# Patient Record
Sex: FEMALE | Race: WHITE | NOT HISPANIC OR LATINO | Employment: UNEMPLOYED | URBAN - METROPOLITAN AREA
[De-identification: names, ages, dates, MRNs, and addresses within clinical notes are randomized per-mention and may not be internally consistent; named-entity substitution may affect disease eponyms.]

---

## 2018-12-18 ENCOUNTER — DOCUMENTATION (OUTPATIENT)
Dept: RADIOLOGY | Age: 53
End: 2018-12-18

## 2018-12-18 DIAGNOSIS — R92.1 BREAST CALCIFICATION SEEN ON MAMMOGRAM: Primary | ICD-10-CM

## 2018-12-18 NOTE — PROGRESS NOTES
I spoke with Debbie Kowalski to discuss recommendations post breast imaging, from [facility]. Debbie was able to provide the outside images and report and this was reviewed by Dr. Kunz on 12/18/18.  I have scheduled Debbie for a consult with Dr. Andrews at 12 noon and the procedure of a ST left breast bx at 1pm.  Debbie has mobility issues and she is confident she will be able to get onto the ST table for the procedure.    I have provided verbal and written instructions for the procedure as well as all of my contact information. I have answered all questions today to the patient's satisfaction.    Follow up as indicated based on pathology results.     Emily Qiunn, RN  MSN, RNC-OB, CBCN  Nurse Navigator at [facility]  [phone]    12/18/2018

## 2018-12-19 ENCOUNTER — HOSPITAL ENCOUNTER (OUTPATIENT)
Dept: RADIOLOGY | Facility: HOSPITAL | Age: 53
Discharge: HOME | End: 2018-12-19
Attending: SURGERY
Payer: COMMERCIAL

## 2018-12-19 ENCOUNTER — OFFICE VISIT (OUTPATIENT)
Dept: SURGERY | Facility: CLINIC | Age: 53
End: 2018-12-19
Payer: COMMERCIAL

## 2018-12-19 VITALS
WEIGHT: 140 LBS | BODY MASS INDEX: 21.97 KG/M2 | HEIGHT: 67 IN | SYSTOLIC BLOOD PRESSURE: 124 MMHG | DIASTOLIC BLOOD PRESSURE: 80 MMHG

## 2018-12-19 VITALS — DIASTOLIC BLOOD PRESSURE: 74 MMHG | HEART RATE: 58 BPM | OXYGEN SATURATION: 100 % | SYSTOLIC BLOOD PRESSURE: 112 MMHG

## 2018-12-19 DIAGNOSIS — R92.1 BREAST CALCIFICATION SEEN ON MAMMOGRAM: ICD-10-CM

## 2018-12-19 DIAGNOSIS — R92.1 CALCIFICATION OF LEFT BREAST: Primary | ICD-10-CM

## 2018-12-19 PROCEDURE — 99203 OFFICE O/P NEW LOW 30 MIN: CPT | Performed by: SURGERY

## 2018-12-19 PROCEDURE — 77065 DX MAMMO INCL CAD UNI: CPT | Mod: LT

## 2018-12-19 RX ORDER — LIDOCAINE HYDROCHLORIDE 10 MG/ML
30 INJECTION, SOLUTION EPIDURAL; INFILTRATION; INTRACAUDAL; PERINEURAL ONCE
Status: DISCONTINUED | OUTPATIENT
Start: 2018-12-19 | End: 2018-12-20 | Stop reason: HOSPADM

## 2018-12-19 RX ORDER — CLONIDINE HYDROCHLORIDE 0.2 MG/1
0.5 TABLET ORAL DAILY
COMMUNITY
End: 2020-04-13 | Stop reason: SDUPTHER

## 2018-12-19 RX ORDER — LORAZEPAM 0.5 MG/1
0.5 TABLET ORAL 3 TIMES DAILY
COMMUNITY
End: 2020-04-13 | Stop reason: SDUPTHER

## 2018-12-19 RX ORDER — ACETAMINOPHEN 500 MG
5 TABLET ORAL NIGHTLY
COMMUNITY

## 2018-12-19 RX ORDER — TRAZODONE HYDROCHLORIDE 100 MG/1
200 TABLET ORAL NIGHTLY
COMMUNITY
End: 2020-07-10 | Stop reason: SDUPTHER

## 2018-12-19 RX ORDER — CARISOPRODOL 350 MG/1
350 TABLET ORAL AS NEEDED
COMMUNITY
End: 2020-10-29
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
## 2018-12-20 ENCOUNTER — TRANSCRIBE ORDERS (OUTPATIENT)
Dept: SURGERY | Facility: CLINIC | Age: 53
End: 2018-12-20

## 2018-12-20 DIAGNOSIS — R92.8 OTHER ABNORMAL AND INCONCLUSIVE FINDINGS ON DIAGNOSTIC IMAGING OF BREAST: Primary | ICD-10-CM

## 2018-12-20 NOTE — PROGRESS NOTES
Discussed with Dr. Kunz 12/19/2018.  Due to insufficient thickness could not do the biopsy.  Although we are getting a machine at [facility] that would be able to do the biopsy this will not be for a couple of months and therefore patient is to have this procedure done at [facility] on 12/21/2018.

## 2018-12-21 ENCOUNTER — HOSPITAL ENCOUNTER (OUTPATIENT)
Dept: RADIOLOGY | Facility: HOSPITAL | Age: 53
Discharge: HOME | End: 2018-12-21
Attending: SURGERY
Payer: COMMERCIAL

## 2018-12-21 VITALS — SYSTOLIC BLOOD PRESSURE: 150 MMHG | HEART RATE: 66 BPM | OXYGEN SATURATION: 98 % | DIASTOLIC BLOOD PRESSURE: 70 MMHG

## 2018-12-21 DIAGNOSIS — R92.8 OTHER ABNORMAL AND INCONCLUSIVE FINDINGS ON DIAGNOSTIC IMAGING OF BREAST: ICD-10-CM

## 2018-12-21 PROCEDURE — 25000000 HC PHARMACY GENERAL: Performed by: RADIOLOGY

## 2018-12-21 PROCEDURE — 0HBU3ZX EXCISION OF LEFT BREAST, PERCUTANEOUS APPROACH, DIAGNOSTIC: ICD-10-PCS | Performed by: RADIOLOGY

## 2018-12-21 PROCEDURE — 76642 ULTRASOUND BREAST LIMITED: CPT | Mod: LT

## 2018-12-21 PROCEDURE — 36100345 BI STEREOTACTIC BREAST BIOPSY LEFT: Mod: LT

## 2018-12-21 PROCEDURE — 88305 TISSUE EXAM BY PATHOLOGIST: CPT | Performed by: SURGERY

## 2018-12-21 RX ORDER — LIDOCAINE HYDROCHLORIDE 10 MG/ML
20 INJECTION, SOLUTION EPIDURAL; INFILTRATION; INTRACAUDAL; PERINEURAL ONCE
Status: COMPLETED | OUTPATIENT
Start: 2018-12-21 | End: 2018-12-21

## 2018-12-21 RX ADMIN — LIDOCAINE HYDROCHLORIDE 20 ML: 10 INJECTION, SOLUTION EPIDURAL; INFILTRATION; INTRACAUDAL; PERINEURAL at 15:23

## 2018-12-21 NOTE — POST-PROCEDURE NOTE
Immediate Breast Interventional Postprocedure Note    Debbie Kowalski     Attending: Enrique Muñiz M.D.  Assistant: Technologist      Diagnosis: Breast Abnormality    Description of procedure: Imaging Guided Percutaneous Breast Biopsy     Laterality: Left    Anesthesia:  Local    Findings: Breast Abnormality    Complications: None    Estimated Blood Loss: Less than 100 ml    Specimens: Breast Tissue---No calcifications seen in specimen.    12/21/2018 3:23 PM

## 2018-12-24 LAB
CASE RPRT: NORMAL
CLINICAL INFO: NORMAL
PATH REPORT.FINAL DX SPEC: NORMAL
PATH REPORT.FINAL DX SPEC: NORMAL
PATH REPORT.GROSS SPEC: NORMAL

## 2018-12-26 ENCOUNTER — DOCUMENTATION (OUTPATIENT)
Dept: RADIOLOGY | Age: 53
End: 2018-12-26

## 2018-12-26 NOTE — PROGRESS NOTES
I had a discussion with Leoncio today:  SHE WAS UPSET ABOUT THE DISCORDANT PATHOLOGY FROM HER BIOPSY.  SHE AND I DISCUSSED THE POSITIONING OF THE CALCIFICATIONS AND THE IMPLANT AS WELL AS THE PAUCITY OF BREAST TISSUE IN THAT AREA.  SHE HAS SPOKEN WITH  AND SCHEDULED A F/U APPT FOR 1/2/18 TO DISCUSS NL.  WE DISCUSSED PROCEDURE AND QUESTIONS TO ASK AT AT VISIT WITH DR. KAUFMAN ON 1/2/18 AS FAR AS IMPLANT MANAGEMENT AND CONCERNS.  I WILL F/U WITH LEONCIO BASED ON PATHOLOGY OF NL.

## 2018-12-28 ENCOUNTER — TELEPHONE (OUTPATIENT)
Dept: SURGERY | Facility: CLINIC | Age: 53
End: 2018-12-28

## 2018-12-28 NOTE — TELEPHONE ENCOUNTER
Patient called to let us know that she would like to cancel her upcoming appointment with us.She thanks  for everything he has done for her.She thinks he's a wonderful doctor but feels more comfortable with a breast surgeon because of her implants .Patient is seeing Dr.Alina Ben[facility]tez at [facility].Patient also stated she has reached out to Emily regarding all of this an also about getting her Pathology Slides.I wished the patient the best of luck an let her know we are here if she needs us.

## 2019-11-04 ENCOUNTER — HOSPITAL ENCOUNTER (OUTPATIENT)
Facility: CLINIC | Age: 54
Discharge: HOME | End: 2019-11-04
Attending: EMERGENCY MEDICINE
Payer: COMMERCIAL

## 2019-11-04 ENCOUNTER — APPOINTMENT (EMERGENCY)
Dept: RADIOLOGY | Facility: HOSPITAL | Age: 54
End: 2019-11-04
Attending: EMERGENCY MEDICINE
Payer: COMMERCIAL

## 2019-11-04 ENCOUNTER — HOSPITAL ENCOUNTER (EMERGENCY)
Facility: HOSPITAL | Age: 54
Discharge: HOME | End: 2019-11-04
Attending: EMERGENCY MEDICINE
Payer: COMMERCIAL

## 2019-11-04 VITALS
OXYGEN SATURATION: 99 % | HEIGHT: 67 IN | DIASTOLIC BLOOD PRESSURE: 75 MMHG | TEMPERATURE: 98.8 F | HEART RATE: 88 BPM | RESPIRATION RATE: 18 BRPM | BODY MASS INDEX: 21.97 KG/M2 | WEIGHT: 140 LBS | SYSTOLIC BLOOD PRESSURE: 124 MMHG

## 2019-11-04 VITALS
OXYGEN SATURATION: 98 % | RESPIRATION RATE: 18 BRPM | SYSTOLIC BLOOD PRESSURE: 141 MMHG | TEMPERATURE: 98.4 F | DIASTOLIC BLOOD PRESSURE: 74 MMHG | HEART RATE: 77 BPM

## 2019-11-04 DIAGNOSIS — K56.7 ILEUS (CMS/HCC): Primary | ICD-10-CM

## 2019-11-04 DIAGNOSIS — R10.84 GENERALIZED ABDOMINAL PAIN: Primary | ICD-10-CM

## 2019-11-04 LAB
ALBUMIN SERPL-MCNC: 4.4 G/DL (ref 3.4–5)
ALP SERPL-CCNC: 58 IU/L (ref 35–126)
ALT SERPL-CCNC: 16 IU/L (ref 11–54)
ANION GAP SERPL CALC-SCNC: 8 MEQ/L (ref 3–15)
AST SERPL-CCNC: 22 IU/L (ref 15–41)
BACTERIA URNS QL MICRO: ABNORMAL /HPF
BASOPHILS # BLD: 0.02 K/UL (ref 0.01–0.1)
BASOPHILS NFR BLD: 0.3 %
BILIRUB DIRECT SERPL-MCNC: 0.2 MG/DL
BILIRUB SERPL-MCNC: 0.6 MG/DL (ref 0.3–1.2)
BILIRUB UR QL STRIP.AUTO: NEGATIVE MG/DL
BUN SERPL-MCNC: 12 MG/DL (ref 8–20)
CALCIUM SERPL-MCNC: 8.6 MG/DL (ref 8.9–10.3)
CHLORIDE SERPL-SCNC: 105 MEQ/L (ref 98–109)
CLARITY UR REFRACT.AUTO: CLEAR
CO2 SERPL-SCNC: 24 MEQ/L (ref 22–32)
COLOR UR AUTO: YELLOW
CREAT SERPL-MCNC: 0.7 MG/DL
DIFFERENTIAL METHOD BLD: ABNORMAL
EOSINOPHIL # BLD: 0.01 K/UL (ref 0.04–0.36)
EOSINOPHIL NFR BLD: 0.1 %
ERYTHROCYTE [DISTWIDTH] IN BLOOD BY AUTOMATED COUNT: 12.2 % (ref 11.7–14.4)
FLUAV RNA SPEC QL NAA+PROBE: NEGATIVE
FLUBV RNA SPEC QL NAA+PROBE: NEGATIVE
GFR SERPL CREATININE-BSD FRML MDRD: >60 ML/MIN/1.73M*2
GLUCOSE SERPL-MCNC: 105 MG/DL (ref 70–99)
GLUCOSE UR STRIP.AUTO-MCNC: NEGATIVE MG/DL
HCT VFR BLDCO AUTO: 43 %
HGB BLD-MCNC: 14.5 G/DL (ref 11.8–15.7)
HGB UR QL STRIP.AUTO: NEGATIVE
HYALINE CASTS #/AREA URNS LPF: ABNORMAL /LPF
IMM GRANULOCYTES # BLD AUTO: 0.02 K/UL (ref 0–0.08)
IMM GRANULOCYTES NFR BLD AUTO: 0.3 %
KETONES UR STRIP.AUTO-MCNC: ABNORMAL MG/DL
LEUKOCYTE ESTERASE UR QL STRIP.AUTO: NEGATIVE
LIPASE SERPL-CCNC: 38 U/L (ref 20–51)
LYMPHOCYTES # BLD: 1.95 K/UL (ref 1.2–3.5)
LYMPHOCYTES NFR BLD: 26.5 %
MAGNESIUM SERPL-MCNC: 1.8 MG/DL (ref 1.8–2.5)
MCH RBC QN AUTO: 30.9 PG (ref 28–33.2)
MCHC RBC AUTO-ENTMCNC: 33.7 G/DL (ref 32.2–35.5)
MCV RBC AUTO: 91.7 FL (ref 83–98)
MONOCYTES # BLD: 1.04 K/UL (ref 0.28–0.8)
MONOCYTES NFR BLD: 14.1 %
NEUTROPHILS # BLD: 4.32 K/UL (ref 1.7–7)
NEUTS SEG NFR BLD: 58.7 %
NITRITE UR QL STRIP.AUTO: NEGATIVE
NRBC BLD-RTO: 0 %
PDW BLD AUTO: 10.2 FL (ref 9.4–12.3)
PH UR STRIP.AUTO: 6.5 [PH]
PLATELET # BLD AUTO: 264 K/UL
POTASSIUM SERPL-SCNC: 3.5 MEQ/L (ref 3.6–5.1)
PROT SERPL-MCNC: 7.8 G/DL (ref 6–8.2)
PROT UR QL STRIP.AUTO: ABNORMAL
RBC # BLD AUTO: 4.69 M/UL (ref 3.93–5.22)
RBC #/AREA URNS HPF: ABNORMAL /HPF
RSV RNA SPEC QL NAA+PROBE: NEGATIVE
SODIUM SERPL-SCNC: 137 MEQ/L (ref 136–144)
SP GR UR REFRACT.AUTO: >1.035
SQUAMOUS URNS QL MICRO: 1 /HPF
TROPONIN I SERPL-MCNC: <0.02 NG/ML
UROBILINOGEN UR STRIP-ACNC: 0.2 EU/DL
WBC # BLD AUTO: 7.36 K/UL
WBC #/AREA URNS HPF: ABNORMAL /HPF

## 2019-11-04 PROCEDURE — 96360 HYDRATION IV INFUSION INIT: CPT

## 2019-11-04 PROCEDURE — 3E0337Z INTRODUCTION OF ELECTROLYTIC AND WATER BALANCE SUBSTANCE INTO PERIPHERAL VEIN, PERCUTANEOUS APPROACH: ICD-10-PCS | Performed by: EMERGENCY MEDICINE

## 2019-11-04 PROCEDURE — 36415 COLL VENOUS BLD VENIPUNCTURE: CPT | Performed by: EMERGENCY MEDICINE

## 2019-11-04 PROCEDURE — 63600105 HC IODINE BASED CONTRAST: Performed by: EMERGENCY MEDICINE

## 2019-11-04 PROCEDURE — 81003 URINALYSIS AUTO W/O SCOPE: CPT | Performed by: EMERGENCY MEDICINE

## 2019-11-04 PROCEDURE — 71045 X-RAY EXAM CHEST 1 VIEW: CPT

## 2019-11-04 PROCEDURE — 99284 EMERGENCY DEPT VISIT MOD MDM: CPT | Mod: 25

## 2019-11-04 PROCEDURE — 25000000 HC PHARMACY GENERAL: Performed by: EMERGENCY MEDICINE

## 2019-11-04 PROCEDURE — 84484 ASSAY OF TROPONIN QUANT: CPT | Performed by: EMERGENCY MEDICINE

## 2019-11-04 PROCEDURE — 99213 OFFICE O/P EST LOW 20 MIN: CPT | Performed by: EMERGENCY MEDICINE

## 2019-11-04 PROCEDURE — 74177 CT ABD & PELVIS W/CONTRAST: CPT

## 2019-11-04 PROCEDURE — 83690 ASSAY OF LIPASE: CPT | Performed by: EMERGENCY MEDICINE

## 2019-11-04 PROCEDURE — 83735 ASSAY OF MAGNESIUM: CPT | Performed by: EMERGENCY MEDICINE

## 2019-11-04 PROCEDURE — 63600000 HC DRUGS/DETAIL CODE: Performed by: EMERGENCY MEDICINE

## 2019-11-04 PROCEDURE — 85025 COMPLETE CBC W/AUTO DIFF WBC: CPT | Performed by: EMERGENCY MEDICINE

## 2019-11-04 PROCEDURE — 96361 HYDRATE IV INFUSION ADD-ON: CPT

## 2019-11-04 PROCEDURE — 63700000 HC SELF-ADMINISTRABLE DRUG: Performed by: EMERGENCY MEDICINE

## 2019-11-04 PROCEDURE — 93005 ELECTROCARDIOGRAM TRACING: CPT | Performed by: EMERGENCY MEDICINE

## 2019-11-04 PROCEDURE — 25800000 HC PHARMACY IV SOLUTIONS: Performed by: EMERGENCY MEDICINE

## 2019-11-04 PROCEDURE — 87631 RESP VIRUS 3-5 TARGETS: CPT | Performed by: EMERGENCY MEDICINE

## 2019-11-04 PROCEDURE — S9088 SERVICES PROVIDED IN URGENT: HCPCS | Performed by: EMERGENCY MEDICINE

## 2019-11-04 PROCEDURE — 80053 COMPREHEN METABOLIC PANEL: CPT | Performed by: EMERGENCY MEDICINE

## 2019-11-04 RX ORDER — OXYCODONE AND ACETAMINOPHEN 5; 325 MG/1; MG/1
1 TABLET ORAL EVERY 6 HOURS PRN
Qty: 5 TABLET | Refills: 0 | Status: SHIPPED | OUTPATIENT
Start: 2019-11-04 | End: 2020-05-23 | Stop reason: SDUPTHER

## 2019-11-04 RX ORDER — ONDANSETRON 4 MG/1
4 TABLET, ORALLY DISINTEGRATING ORAL EVERY 8 HOURS PRN
Qty: 12 TABLET | Refills: 0 | Status: SHIPPED | OUTPATIENT
Start: 2019-11-04 | End: 2020-10-29

## 2019-11-04 RX ORDER — KETOROLAC TROMETHAMINE 15 MG/ML
15 INJECTION, SOLUTION INTRAMUSCULAR; INTRAVENOUS ONCE
Status: COMPLETED | OUTPATIENT
Start: 2019-11-04 | End: 2019-11-04

## 2019-11-04 RX ORDER — IPRATROPIUM BROMIDE AND ALBUTEROL SULFATE 2.5; .5 MG/3ML; MG/3ML
3 SOLUTION RESPIRATORY (INHALATION) ONCE
Status: COMPLETED | OUTPATIENT
Start: 2019-11-04 | End: 2019-11-04

## 2019-11-04 RX ORDER — LORAZEPAM 0.5 MG/1
TABLET ORAL
Status: DISCONTINUED
Start: 2019-11-04 | End: 2019-11-05 | Stop reason: HOSPADM

## 2019-11-04 RX ORDER — LORAZEPAM 0.5 MG/1
0.5 TABLET ORAL ONCE
Status: COMPLETED | OUTPATIENT
Start: 2019-11-04 | End: 2019-11-04

## 2019-11-04 RX ORDER — MORPHINE SULFATE 4 MG/ML
4 INJECTION, SOLUTION INTRAMUSCULAR; INTRAVENOUS ONCE
Status: COMPLETED | OUTPATIENT
Start: 2019-11-04 | End: 2019-11-04

## 2019-11-04 RX ADMIN — KETOROLAC TROMETHAMINE 15 MG: 15 INJECTION, SOLUTION INTRAMUSCULAR; INTRAVENOUS at 16:31

## 2019-11-04 RX ADMIN — LORAZEPAM 0.5 MG: 0.5 TABLET ORAL at 17:00

## 2019-11-04 RX ADMIN — SODIUM CHLORIDE 2000 ML: 9 INJECTION, SOLUTION INTRAVENOUS at 16:32

## 2019-11-04 RX ADMIN — IOHEXOL 115 ML: 350 INJECTION, SOLUTION INTRAVENOUS at 19:35

## 2019-11-04 RX ADMIN — IPRATROPIUM BROMIDE AND ALBUTEROL SULFATE 3 ML: 2.5; .5 SOLUTION RESPIRATORY (INHALATION) at 16:33

## 2019-11-04 RX ADMIN — MORPHINE SULFATE 4 MG: 4 INJECTION, SOLUTION INTRAMUSCULAR; INTRAVENOUS at 16:31

## 2019-11-04 ASSESSMENT — ENCOUNTER SYMPTOMS
CHILLS: 1
PALPITATIONS: 0
DIAPHORESIS: 0
STRIDOR: 0
SEIZURES: 0
COUGH: 1
SORE THROAT: 1
WHEEZING: 1
SPEECH DIFFICULTY: 0
ABDOMINAL PAIN: 1
DIZZINESS: 1
FEVER: 1
BACK PAIN: 0
BELCHING: 1
CONFUSION: 0
NUMBNESS: 0
EYE REDNESS: 1
NECK PAIN: 0
BLOOD IN STOOL: 0
WOUND: 0
DIARRHEA: 1
VOMITING: 1
ABDOMINAL PAIN: 1
HEMATURIA: 0
FLANK PAIN: 0
HEADACHES: 1
DIARRHEA: 1
SHORTNESS OF BREATH: 1
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
## 2019-11-05 LAB
ATRIAL RATE: 82
P AXIS: 85
PR INTERVAL: 198
QRS DURATION: 78
QT INTERVAL: 384
QTC CALCULATION(BAZETT): 448
R AXIS: 43
T WAVE AXIS: 57
VENTRICULAR RATE: 82

## 2019-11-05 PROCEDURE — 93010 ELECTROCARDIOGRAM REPORT: CPT | Performed by: INTERNAL MEDICINE

## 2020-04-13 ENCOUNTER — TELEMEDICINE (OUTPATIENT)
Dept: FAMILY MEDICINE | Facility: CLINIC | Age: 55
End: 2020-04-13
Payer: COMMERCIAL

## 2020-04-13 DIAGNOSIS — G90.522 COMPLEX REGIONAL PAIN SYNDROME TYPE 1 OF LEFT LOWER EXTREMITY: Primary | ICD-10-CM

## 2020-04-13 PROCEDURE — 99212 OFFICE O/P EST SF 10 MIN: CPT | Mod: 95 | Performed by: INTERNAL MEDICINE

## 2020-04-13 RX ORDER — LORAZEPAM 0.5 MG/1
0.5 TABLET ORAL 3 TIMES DAILY
Qty: 90 TABLET | Refills: 1 | Status: SHIPPED | OUTPATIENT
Start: 2020-04-13 | End: 2020-06-10 | Stop reason: SDUPTHER

## 2020-04-13 RX ORDER — CLONIDINE HYDROCHLORIDE 0.2 MG/1
0.5 TABLET ORAL DAILY
Qty: 90 TABLET | Refills: 1 | Status: SHIPPED | OUTPATIENT
Start: 2020-04-13 | End: 2020-04-16

## 2020-04-13 RX ORDER — BACLOFEN 10 MG/1
10 TABLET ORAL 2 TIMES DAILY
Qty: 180 TABLET | Refills: 1 | Status: SHIPPED | OUTPATIENT
Start: 2020-04-13 | End: 2022-01-05 | Stop reason: ALTCHOICE

## 2020-04-13 NOTE — PROGRESS NOTES
Request for Consent:  You and I are about to have a telemedicine check-in or visit. This is allowed because you are already my patient, and you have requested it.  This telemedicine visit will be billed to your health insurance or you, if you are self-insured.  You understand you will be responsible for any copayments or coinsurances that apply to your telemedicine visit.  Before starting our telemedicine visit, I am required to get your consent for this virtual check-in or visit by telemedicine. Do you consent?      Patient Response to Request for Consent: Yes    The following have been reviewed and updated as appropriate in this visit:         Visit Documentation:    53 y/o female presents for telemedicine visit.   CRPS - baclofen and soma prn   Medical marijuana helping     Still left arm pain - once able to purse cervical injetion  Time spent 12 minutes  A&P   1. CRPS - continue current medication regimen.    Time Spent in Medical Discussion During This Encounter:  12 minutes

## 2020-04-15 ENCOUNTER — TELEPHONE (OUTPATIENT)
Dept: FAMILY MEDICINE | Facility: CLINIC | Age: 55
End: 2020-04-15

## 2020-04-16 ENCOUNTER — TELEPHONE (OUTPATIENT)
Dept: FAMILY MEDICINE | Facility: CLINIC | Age: 55
End: 2020-04-16

## 2020-04-16 RX ORDER — CLONIDINE HYDROCHLORIDE 0.1 MG/1
0.1 TABLET ORAL 2 TIMES DAILY
Qty: 180 TABLET | Refills: 1 | Status: SHIPPED | OUTPATIENT
Start: 2020-04-16 | End: 2020-07-10 | Stop reason: SDUPTHER

## 2020-04-16 NOTE — TELEPHONE ENCOUNTER
Pharmacist Galina 4  invoice # [phone]  Would like a call back with questions regarding  Both  Medication Rx Clonidine 0.1 mg and Rx Clonidine 0.2mg

## 2020-04-16 NOTE — TELEPHONE ENCOUNTER
Patient states clonidine should be 0.1 MG.    Script was sent for 0.2MG (please also review directions)    Need to send corrected script to [facility]

## 2020-05-07 ENCOUNTER — TELEMEDICINE (OUTPATIENT)
Dept: FAMILY MEDICINE | Facility: CLINIC | Age: 55
End: 2020-05-07
Payer: COMMERCIAL

## 2020-05-07 DIAGNOSIS — G44.52 NEW DAILY PERSISTENT HEADACHE: ICD-10-CM

## 2020-05-07 DIAGNOSIS — G90.522 COMPLEX REGIONAL PAIN SYNDROME TYPE 1 OF LEFT LOWER EXTREMITY: Primary | ICD-10-CM

## 2020-05-07 PROCEDURE — 99213 OFFICE O/P EST LOW 20 MIN: CPT | Mod: 95 | Performed by: INTERNAL MEDICINE

## 2020-05-07 RX ORDER — OMEPRAZOLE 40 MG/1
40 CAPSULE, DELAYED RELEASE ORAL
Qty: 90 CAPSULE | Refills: 1 | Status: SHIPPED | OUTPATIENT
Start: 2020-05-07 | End: 2022-10-20

## 2020-05-07 NOTE — PROGRESS NOTES
Verification of Patient Location:  The patient affirms they are currently located in the following state:[place]     Request for Consent:  You and I are about to have a telemedicine check-in or visit. This is allowed because you are already my patient, and you have requested it.  This telemedicine visit will be billed to your health insurance or you, if you are self-insured.  You understand you will be responsible for any copayments or coinsurances that apply to your telemedicine visit.  Before starting our telemedicine visit, I am required to get your consent for this virtual check-in or visit by telemedicine. Do you consent?      Patient Response to Request for Consent: Yes    The following have been reviewed and updated as appropriate in this visit:         Visit Documentation:    55 y/o female presents for telemedicine visit  Morning headaches x 1 week  Also hot flashes at night, poor sleep   Increased anxiety.   No visual changes  Headache last 1-2 hours   Time Spent 15 minutes   A&P  1. Headache - ? Poor sleep  Check BP in the morning   Monitor   Time Spent in Medical Discussion During This Encounter:  15 minutes

## 2020-05-18 ENCOUNTER — TELEPHONE (OUTPATIENT)
Dept: FAMILY MEDICINE | Facility: CLINIC | Age: 55
End: 2020-05-18

## 2020-05-18 NOTE — TELEPHONE ENCOUNTER
"Patient was asked to monitor bp, states woke up one morning feeling \"heated\" with headache and bp reading of 175/90 also pulse of 75    States since then has been 15/68, pulse usually runs in low 60's.    Patient can be reached at [phone]  "

## 2020-05-22 NOTE — TELEPHONE ENCOUNTER
Medicine Refill Request    Last Office Visit: Visit date not found  Last Telemedicine Visit: 5/7/2020 Liang Gibbs DO    Next Office Visit: Visit date not found  Next Telemedicine Visit: Visit date not found         Current Outpatient Medications:   •  baclofen (LIORESAL) 10 mg tablet, Take 1 tablet (10 mg total) by mouth 2 (two) times a day., Disp: 180 tablet, Rfl: 1  •  carisoprodol (SOMA) 350 mg tablet, Take 350 mg by mouth as needed for muscle spasms., Disp: , Rfl:   •  cloNIDine (CATAPRES) 0.1 mg tablet, Take 1 tablet (0.1 mg total) by mouth 2 (two) times a day., Disp: 180 tablet, Rfl: 1  •  LORazepam (ATIVAN) 0.5 mg tablet, Take 1 tablet (0.5 mg total) by mouth 3 (three) times a day., Disp: 90 tablet, Rfl: 1  •  melatonin 3 mg tablet, Take 5 mg by mouth nightly., Disp: , Rfl:   •  NOT IN DATABASE, Patient on a antibiotic bid , not sure of the name, Disp: , Rfl:   •  omeprazole (PriLOSEC) 40 mg capsule, Take 1 capsule (40 mg total) by mouth daily before breakfast., Disp: 90 capsule, Rfl: 1  •  ondansetron ODT (ZOFRAN-ODT) 4 mg disintegrating tablet, Take 1 tablet (4 mg total) by mouth every 8 (eight) hours as needed for nausea or vomiting., Disp: 12 tablet, Rfl: 0  •  oxyCODONE-acetaminophen (PERCOCET) 5-325 mg per tablet, Take 1 tablet by mouth every 6 (six) hours as needed for moderate pain. Initial treatment.  NO driving or operating heavy machinery, Disp: 5 tablet, Rfl: 0  •  traZODone (DESYREL) 100 mg tablet, Take 200 mg by mouth nightly., Disp: , Rfl:       BP Readings from Last 3 Encounters:   11/04/19 (!) 141/74   11/04/19 124/75   12/21/18 (!) 150/70       Recent Lab results:  No results found for: CHOL, No results found for: HDL, No results found for: LDLCALC, No results found for: TRIG     Lab Results   Component Value Date    GLUCOSE 105 (H) 11/04/2019   , No results found for: HGBA1C      Lab Results   Component Value Date    CREATININE 0.7 11/04/2019       No results found for: TSH

## 2020-05-23 RX ORDER — HYDROCODONE BITARTRATE AND ACETAMINOPHEN 5; 325 MG/1; MG/1
1 TABLET ORAL 2 TIMES DAILY PRN
Qty: 30 TABLET | Refills: 0 | Status: SHIPPED | OUTPATIENT
Start: 2020-05-23 | End: 2020-08-06 | Stop reason: SDUPTHER

## 2020-05-23 NOTE — PROGRESS NOTES
Patient requesting a refill for Hydrocodone. Reports calling office earlier this week.   PDMP reviewed. Uses sparingly. Will refill

## 2020-05-26 RX ORDER — OXYCODONE AND ACETAMINOPHEN 5; 325 MG/1; MG/1
1 TABLET ORAL EVERY 6 HOURS PRN
Qty: 5 TABLET | Refills: 0 | Status: SHIPPED | OUTPATIENT
Start: 2020-05-26 | End: 2020-08-06 | Stop reason: SDUPTHER

## 2020-05-28 ENCOUNTER — TELEPHONE (OUTPATIENT)
Dept: FAMILY MEDICINE | Facility: CLINIC | Age: 55
End: 2020-05-28

## 2020-05-28 RX ORDER — PAROXETINE 10 MG/1
10 TABLET, FILM COATED ORAL DAILY
Qty: 90 TABLET | Refills: 3 | Status: SHIPPED | OUTPATIENT
Start: 2020-05-28 | End: 2021-03-26 | Stop reason: SDUPTHER

## 2020-06-10 ENCOUNTER — CONSULT (OUTPATIENT)
Dept: FAMILY MEDICINE | Facility: CLINIC | Age: 55
End: 2020-06-10
Payer: COMMERCIAL

## 2020-06-10 VITALS
BODY MASS INDEX: 22.76 KG/M2 | DIASTOLIC BLOOD PRESSURE: 78 MMHG | TEMPERATURE: 98.3 F | OXYGEN SATURATION: 95 % | HEART RATE: 67 BPM | WEIGHT: 145 LBS | RESPIRATION RATE: 16 BRPM | SYSTOLIC BLOOD PRESSURE: 120 MMHG | HEIGHT: 67 IN

## 2020-06-10 DIAGNOSIS — Z01.818 PREOPERATIVE CLEARANCE: ICD-10-CM

## 2020-06-10 DIAGNOSIS — G90.522 COMPLEX REGIONAL PAIN SYNDROME TYPE 1 OF LEFT LOWER EXTREMITY: ICD-10-CM

## 2020-06-10 DIAGNOSIS — F43.10 PTSD (POST-TRAUMATIC STRESS DISORDER): ICD-10-CM

## 2020-06-10 DIAGNOSIS — M79.642 LEFT HAND PAIN: Primary | ICD-10-CM

## 2020-06-10 PROCEDURE — 93000 ELECTROCARDIOGRAM COMPLETE: CPT | Performed by: INTERNAL MEDICINE

## 2020-06-10 PROCEDURE — 99214 OFFICE O/P EST MOD 30 MIN: CPT | Performed by: INTERNAL MEDICINE

## 2020-06-10 RX ORDER — AMMONIUM LACTATE 12 G/100G
CREAM TOPICAL
COMMUNITY
Start: 2018-12-15 | End: 2020-10-29

## 2020-06-10 RX ORDER — MULTIVITAMIN
TABLET ORAL
COMMUNITY
Start: 2014-12-19

## 2020-06-10 RX ORDER — LORAZEPAM 0.5 MG/1
0.5 TABLET ORAL 3 TIMES DAILY
Qty: 90 TABLET | Refills: 1 | Status: SHIPPED | OUTPATIENT
Start: 2020-06-10 | End: 2020-08-10

## 2020-06-10 NOTE — PROGRESS NOTES
"Subjective      Patient ID: Debbie Kowalski is a 54 y.o. female.  1965      53 y/o female presents for pre-operative evaluation   Left hand reconstruction sheath - 6/17    METs > 4 with use of crutches or 4 prong cane   No CP, SOB  No easy bruising bleeding   History of surgeries in the past   No adverse reaction to general anesthesia in the past         The following have been reviewed and updated as appropriate in this visit:  Tobacco  Allergies  Meds  Med Hx  Surg Hx  Fam Hx  Soc Hx      Review of Systems   All other systems reviewed and are negative.      Objective     Vitals:    06/10/20 1003   BP: 120/78   BP Location: Right upper arm   Patient Position: Sitting   Pulse: 67   Resp: 16   Temp: 36.8 °C (98.3 °F)   TempSrc: Tympanic   SpO2: 95%   Weight: 65.8 kg (145 lb)   Height: 1.702 m (5' 7\")     Body mass index is 22.71 kg/m².    Physical Exam   Constitutional: She is oriented to person, place, and time. She appears well-developed and well-nourished.   HENT:   Head: Normocephalic and atraumatic.   Eyes: Pupils are equal, round, and reactive to light. Conjunctivae and EOM are normal.   Cardiovascular: Normal rate, regular rhythm, normal heart sounds and intact distal pulses.   No murmur heard.  Pulmonary/Chest: Effort normal and breath sounds normal. She has no wheezes.   Musculoskeletal: She exhibits no edema.   Neurological: She is alert and oriented to person, place, and time.   Psychiatric: She has a normal mood and affect. Her behavior is normal. Judgment and thought content normal.       Assessment/Plan   Diagnoses and all orders for this visit:    Preoperative clearance (Primary)  -     ECG 12 LEAD OFFICE PERFORMED    Left hand pain  Patient is low risk for surgery   She has no underlying cardiovascular disease nor HTN   Her EKG - sinus 60 axis wnl, no ST changes, T waves in V1, V2 no change from prior 11/2019   Do not take any NSAIDs (Motrin, Advil, Ibuprofen, Naproxen, Aleve, Aspirin or any " prescription) 7 days prior to surgery  Also please do not take if taking fish oil 7 days prior to surgery     PTSD (post-traumatic stress disorder)    Complex regional pain syndrome type 1 of left lower extremity    Other orders  -     LORazepam (ATIVAN) 0.5 mg tablet; Take 1 tablet (0.5 mg total) by mouth 3 (three) times a day.

## 2020-06-10 NOTE — PATIENT INSTRUCTIONS
Do not take any NSAIDs (Motrin, Advil, Ibuprofen, Naproxen, Aleve, Aspirin or any prescription) 7 days prior to surgery  Also please do not take if taking fish oil 7 days prior to surgery   Proceed with surgery

## 2020-06-30 ENCOUNTER — TRANSCRIBE ORDERS (OUTPATIENT)
Dept: OCCUPATIONAL THERAPY | Facility: HOSPITAL | Age: 55
End: 2020-06-30

## 2020-06-30 ENCOUNTER — HOSPITAL ENCOUNTER (OUTPATIENT)
Dept: OCCUPATIONAL THERAPY | Facility: HOSPITAL | Age: 55
Setting detail: THERAPIES SERIES
Discharge: HOME | End: 2020-06-30
Attending: ORTHOPAEDIC SURGERY
Payer: COMMERCIAL

## 2020-06-30 DIAGNOSIS — M65.832 OTHER SYNOVITIS AND TENOSYNOVITIS, LEFT FOREARM: Primary | ICD-10-CM

## 2020-06-30 DIAGNOSIS — M65.832 OTHER SYNOVITIS AND TENOSYNOVITIS, LEFT FOREARM: ICD-10-CM

## 2020-06-30 PROCEDURE — 97760 ORTHOTIC MGMT&TRAING 1ST ENC: CPT | Mod: GO

## 2020-06-30 PROCEDURE — 97165 OT EVAL LOW COMPLEX 30 MIN: CPT | Mod: GO

## 2020-06-30 NOTE — OP OT TREATMENT LOG
Diagnosis     Ins:   Eval: 6/30/20  POC till: 8/25/20          Total Time for Session Not performed   Modalities Comment Time Done Today Group   Paraffin       Fluidotherapy       Ultrasound       Hot Pack                      Total Time for Session Not performed   Wound Care Comment Time Done Today   < 20 cm       >20 cm      Scar control                   Total Time for Session Not performed   Massage Comment Time Done Today   Retrograde      Scar      Soft Tissue Massage             Total Time for Session Not performed   Therapeutic Exercise Comment Time Done Today   AROM      PROM      AAROM      TGE      Blocking Exercise                   Total Time for Session Not performed   Therapeutic Activity Comment Sets Reps Time Done Today Group   BTE         Peg Feeding - DLM         Velcro checkers         Clothespin/ruler         Gripper/ 1” blocks         Corn discrimination         Rice discrimination         [place] Transfer         O'Santos tweezer         O'Santos pegboard         Grooved pegboard         Wrist Roll         Theraputty         Tyler box         Pipe Assembly - PVC         Pipe assembly - metal         Connect a link         MRMT         Puzzles         Playing cards         Blue water pump         Washer/ dowel         Pegboard Activities         Toothpicks         Serial Knob Table - Resistive prehension Bench         Dumbbell weights         Cuff weights         Juxacisor         Tyler Board         [place] Assortment         Box/Blocks         Pop beads         Hammer Exercises                            Total Time for Session 38-52 Minutes   Orthotics Comment Done Today     Volar wrist L Volar wrist splint was fabricated.  Custom made splint from [employer]. 45   Ulnar gutter     Thumb Spica     Finger-based     Mallet     Prefabricated     Hand based      Elbow          Patient instruction/comments Patient was instructed in don/doff, purpose, care and wearing schedule of splint. Patient  demonstrated understanding of all instructions.
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
## 2020-07-07 ENCOUNTER — HOSPITAL ENCOUNTER (OUTPATIENT)
Dept: OCCUPATIONAL THERAPY | Facility: HOSPITAL | Age: 55
Setting detail: THERAPIES SERIES
Discharge: HOME | End: 2020-07-07
Attending: ORTHOPAEDIC SURGERY
Payer: COMMERCIAL

## 2020-07-07 DIAGNOSIS — M65.832 OTHER SYNOVITIS AND TENOSYNOVITIS, LEFT FOREARM: Primary | ICD-10-CM

## 2020-07-07 PROCEDURE — 97763 ORTHC/PROSTC MGMT SBSQ ENC: CPT | Mod: GO

## 2020-07-07 NOTE — PROGRESS NOTES
OT DAILY NOTE FOR OUTPATIENT THERAPY    Patient: Debbie Kowalski   MRN: [id number]  : 1965 54 y.o.  Referring Physician: Jatin Salazar, *  Date of Visit: 2020      Certification Dates: 20 through 20    Diagnosis:   1. Other synovitis and tenosynovitis, left forearm        Chief Complaints:   Patient requested splint adjustment.    Precautions:  Existing Precautions/Restrictions: brace worn at all times    TODAY'S VISIT    History/Vitals/Pain/Encounter Info - 20 1057        Injury History/Precautions/Daily Required Info    Document Type  daily treatment     Primary Therapist  Sylvia Sheikh OT     Chief Complaint/Reason for Visit   Patient requested splint adjustment.     Onset of Illness/Injury or Date of Surgery  19     Referring Physician  Dr. Salazar     Existing Precautions/Restrictions  brace worn at all times     Patient/Family/Caregiver Comments/Observations  Patient reported discomfort at thumb web space and with tab of splint.     Start Time  1000     Stop Time  1015     Time Calculation (min)  15 min     Patient reported fall since last visit  No        Pain Assessment    Currently in pain  No/Denies     Preferred Pain Scale  number (Numeric Rating Pain Scale)     Pain Rating (0-10): Pre Activity  0        Pain Interventions    Intervention   None     Post Intervention Comments  None         Daily Treatment Assessment and Plan - 20 1220        Daily Treatment Assessment and Plan    Progress toward goals  Progressing     Daily Outcome Summary  Patient was seen this day for splint revision. Patient reported that she felt that the fit was better.     Plan and Recommendations  Patient is to follow-up with Dr. Salazar in 6 weeks.       Today's Treatment:        Diagnosis     Ins:   Eval: 20  POC till: 20          Total Time for Session Not performed   Modalities Comment Time Done Today Group   Paraffin       Fluidotherapy        Ultrasound       Hot Pack                      Total Time for Session Not performed   Wound Care Comment Time Done Today   < 20 cm       >20 cm      Scar control                   Total Time for Session Not performed   Massage Comment Time Done Today   Retrograde      Scar      Soft Tissue Massage             Total Time for Session Not performed   Therapeutic Exercise Comment Time Done Today   AROM      PROM      AAROM      TGE      Blocking Exercise                   Total Time for Session Not performed   Therapeutic Activity Comment Sets Reps Time Done Today Group   BTE         Peg Feeding - DLM         Velcro checkers         Clothespin/ruler         Gripper/ 1” blocks         Corn discrimination         Rice discrimination         [place] Transfer         O'Santos tweezer         O'Santos pegboard         Grooved pegboard         Wrist Roll         Theraputty         [place] box         Pipe Assembly - PVC         Pipe assembly - metal         Connect a link         MRMT         Puzzles         Playing cards         Blue water pump         Washer/ dowel         Pegboard Activities         Toothpicks         Serial Knob Table - Resistive prehension Bench         Dumbbell weights         Cuff weights         Juxacisor         [place] Board         [place] Assortment         Box/Blocks         Pop beads         Hammer Exercises                            Total Time for Session 8-22 Minutes   Orthotics Comment Done Today     Volar wrist Patient's splint was modified within web space of thumb and tab of splint to improve comfort level. Patient reported that splint felt better.  15   Ulnar gutter     Thumb Spica     Finger-based     Mallet     Prefabricated     Hand based      Elbow          Patient instruction/comments Patient was instructed to call with any other concerns.

## 2020-07-07 NOTE — OP OT TREATMENT LOG
Diagnosis     Ins:   Eval: 6/30/20  POC till: 8/25/20          Total Time for Session Not performed   Modalities Comment Time Done Today Group   Paraffin       Fluidotherapy       Ultrasound       Hot Pack                      Total Time for Session Not performed   Wound Care Comment Time Done Today   < 20 cm       >20 cm      Scar control                   Total Time for Session Not performed   Massage Comment Time Done Today   Retrograde      Scar      Soft Tissue Massage             Total Time for Session Not performed   Therapeutic Exercise Comment Time Done Today   AROM      PROM      AAROM      TGE      Blocking Exercise                   Total Time for Session Not performed   Therapeutic Activity Comment Sets Reps Time Done Today Group   BTE         Peg Feeding - DLM         Velcro checkers         Clothespin/ruler         Gripper/ 1” blocks         Corn discrimination         Rice discrimination         [place] Transfer         O'Santos tweezer         O'Santos pegboard         Grooved pegboard         Wrist Roll         Theraputty         [place] box         Pipe Assembly - PVC         Pipe assembly - metal         Connect a link         MRMT         Puzzles         Playing cards         Blue water pump         Washer/ dowel         Pegboard Activities         Toothpicks         [place]ob Table - Resistive prehension Bench         Dumbbell weights         Cuff weights         Juxacisor         [place] Board         [place] Assortment         Box/Blocks         Pop beads         Hammer Exercises                            Total Time for Session 8-22 Minutes   Orthotics Comment Done Today     Volar wrist Patient's splint was modified within web space of thumb and tab of splint to improve comfort level. Patient reported that splint felt better.  15   Ulnar gutter     Thumb Spica     Finger-based     Mallet     Prefabricated     Hand based      Elbow          Patient instruction/comments Patient was instructed to call  with any other concerns.

## 2020-07-10 ENCOUNTER — TELEPHONE (OUTPATIENT)
Dept: FAMILY MEDICINE | Facility: CLINIC | Age: 55
End: 2020-07-10

## 2020-07-10 RX ORDER — CLONIDINE HYDROCHLORIDE 0.1 MG/1
0.1 TABLET ORAL DAILY
Qty: 90 TABLET | Refills: 0 | Status: SHIPPED | OUTPATIENT
Start: 2020-07-10 | End: 2020-12-21

## 2020-07-10 RX ORDER — TRAZODONE HYDROCHLORIDE 100 MG/1
TABLET ORAL
Qty: 180 TABLET | Refills: 0 | Status: SHIPPED | OUTPATIENT
Start: 2020-07-10 | End: 2020-10-14

## 2020-07-10 NOTE — TELEPHONE ENCOUNTER
Correction needed on clonidine. Patient takes one per day.    Refill trazodone 100 mg -----2---- per nigh    [facility]

## 2020-07-10 NOTE — TELEPHONE ENCOUNTER
Medicine Refill Request    Last Office Visit: Visit date not found  Last Telemedicine Visit: 5/7/2020 Liang Gibbs,     Next Office Visit: Visit date not found  Next Telemedicine Visit: Visit date not found         Current Outpatient Medications:   •  ammonium lactate (AMLACTIN) 12 % cream, APPLY TO FOOT DAILY, Disp: , Rfl:   •  baclofen (LIORESAL) 10 mg tablet, Take 1 tablet (10 mg total) by mouth 2 (two) times a day. (Patient not taking: Reported on 6/10/2020 ), Disp: 180 tablet, Rfl: 1  •  carisoprodol (SOMA) 350 mg tablet, Take 350 mg by mouth as needed for muscle spasms., Disp: , Rfl:   •  cloNIDine (CATAPRES) 0.1 mg tablet, Take 1 tablet (0.1 mg total) by mouth 2 (two) times a day., Disp: 180 tablet, Rfl: 1  •  LORazepam (ATIVAN) 0.5 mg tablet, Take 1 tablet (0.5 mg total) by mouth 3 (three) times a day., Disp: 90 tablet, Rfl: 1  •  melatonin 5 mg tablet, Take 5 mg by mouth nightly.  , Disp: , Rfl:   •  multivitamin (THERAGRAN) tablet, take 1 tablet by oral route  every day, Disp: , Rfl:   •  NOT IN DATABASE, Patient on a antibiotic bid , not sure of the name, Disp: , Rfl:   •  omeprazole (PriLOSEC) 40 mg capsule, Take 1 capsule (40 mg total) by mouth daily before breakfast., Disp: 90 capsule, Rfl: 1  •  ondansetron ODT (ZOFRAN-ODT) 4 mg disintegrating tablet, Take 1 tablet (4 mg total) by mouth every 8 (eight) hours as needed for nausea or vomiting. (Patient not taking: Reported on 6/10/2020 ), Disp: 12 tablet, Rfl: 0  •  oxyCODONE-acetaminophen (PERCOCET) 5-325 mg per tablet, Take 1 tablet by mouth every 6 (six) hours as needed for moderate pain. Initial treatment.  NO driving or operating heavy machinery, Disp: 5 tablet, Rfl: 0  •  PARoxetine (PAXIL) 10 mg tablet, Take 1 tablet (10 mg total) by mouth daily., Disp: 90 tablet, Rfl: 3  •  traZODone (DESYREL) 100 mg tablet, Take 200 mg by mouth nightly., Disp: , Rfl:       BP Readings from Last 3 Encounters:   06/10/20 120/78   11/04/19 (!) 141/74   11/04/19  124/75       Recent Lab results:  No results found for: CHOL, No results found for: HDL, No results found for: LDLCALC, No results found for: TRIG     Lab Results   Component Value Date    GLUCOSE 105 (H) 11/04/2019   , No results found for: HGBA1C      Lab Results   Component Value Date    CREATININE 0.7 11/04/2019       No results found for: TSH

## 2020-07-30 ENCOUNTER — TRANSCRIBE ORDERS (OUTPATIENT)
Dept: SCHEDULING | Age: 55
End: 2020-07-30

## 2020-07-30 DIAGNOSIS — Z01.818 ENCOUNTER FOR OTHER PREPROCEDURAL EXAMINATION: Primary | ICD-10-CM

## 2020-08-06 ENCOUNTER — TELEPHONE (OUTPATIENT)
Dept: FAMILY MEDICINE | Facility: CLINIC | Age: 55
End: 2020-08-06

## 2020-08-06 DIAGNOSIS — R52 MODERATE PAIN: Primary | ICD-10-CM

## 2020-08-06 RX ORDER — HYDROCODONE BITARTRATE AND ACETAMINOPHEN 5; 325 MG/1; MG/1
1 TABLET ORAL 2 TIMES DAILY PRN
Qty: 30 TABLET | Refills: 0 | Status: SHIPPED | OUTPATIENT
Start: 2020-08-06 | End: 2020-09-05

## 2020-08-06 RX ORDER — OXYCODONE AND ACETAMINOPHEN 5; 325 MG/1; MG/1
1 TABLET ORAL EVERY 6 HOURS PRN
Qty: 5 TABLET | Refills: 0 | Status: SHIPPED | OUTPATIENT
Start: 2020-08-06 | End: 2020-10-29 | Stop reason: ENTERED-IN-ERROR

## 2020-08-06 NOTE — TELEPHONE ENCOUNTER
Pt would like rx Percocet 5-325 mg called into [phone]. If any questions Pt can be reached at

## 2020-08-06 NOTE — TELEPHONE ENCOUNTER
Medicine Refill Request    Last Office Visit: Visit date not found  Last Telemedicine Visit: 5/7/2020 Liang Gibbs DO    Next Office Visit: Visit date not found  Next Telemedicine Visit: Visit date not found    last refill 05/26/2020      Current Outpatient Medications:   •  ammonium lactate (AMLACTIN) 12 % cream, APPLY TO FOOT DAILY, Disp: , Rfl:   •  baclofen (LIORESAL) 10 mg tablet, Take 1 tablet (10 mg total) by mouth 2 (two) times a day. (Patient not taking: Reported on 6/10/2020 ), Disp: 180 tablet, Rfl: 1  •  carisoprodol (SOMA) 350 mg tablet, Take 350 mg by mouth as needed for muscle spasms., Disp: , Rfl:   •  cloNIDine (CATAPRES) 0.1 mg tablet, Take 1 tablet (0.1 mg total) by mouth daily., Disp: 90 tablet, Rfl: 0  •  LORazepam (ATIVAN) 0.5 mg tablet, Take 1 tablet (0.5 mg total) by mouth 3 (three) times a day., Disp: 90 tablet, Rfl: 1  •  melatonin 5 mg tablet, Take 5 mg by mouth nightly.  , Disp: , Rfl:   •  multivitamin (THERAGRAN) tablet, take 1 tablet by oral route  every day, Disp: , Rfl:   •  NOT IN DATABASE, Patient on a antibiotic bid , not sure of the name, Disp: , Rfl:   •  omeprazole (PriLOSEC) 40 mg capsule, Take 1 capsule (40 mg total) by mouth daily before breakfast., Disp: 90 capsule, Rfl: 1  •  ondansetron ODT (ZOFRAN-ODT) 4 mg disintegrating tablet, Take 1 tablet (4 mg total) by mouth every 8 (eight) hours as needed for nausea or vomiting. (Patient not taking: Reported on 6/10/2020 ), Disp: 12 tablet, Rfl: 0  •  oxyCODONE-acetaminophen (PERCOCET) 5-325 mg per tablet, Take 1 tablet by mouth every 6 (six) hours as needed for moderate pain. Initial treatment.  NO driving or operating heavy machinery, Disp: 5 tablet, Rfl: 0  •  PARoxetine (PAXIL) 10 mg tablet, Take 1 tablet (10 mg total) by mouth daily., Disp: 90 tablet, Rfl: 3  •  traZODone (DESYREL) 100 mg tablet, Take two tablets at night., Disp: 180 tablet, Rfl: 0      BP Readings from Last 3 Encounters:   06/10/20 120/78   11/04/19 (!)  141/74   11/04/19 124/75       Recent Lab results:  No results found for: CHOL, No results found for: HDL, No results found for: LDLCALC, No results found for: TRIG     Lab Results   Component Value Date    GLUCOSE 105 (H) 11/04/2019   , No results found for: HGBA1C      Lab Results   Component Value Date    CREATININE 0.7 11/04/2019       No results found for: TSH

## 2020-08-06 NOTE — TELEPHONE ENCOUNTER
[facility]  would like a call regarding which rx to be filled for Pt

## 2020-08-06 NOTE — TELEPHONE ENCOUNTER
Medicine Refill Request    Last Office Visit: Visit date not found  Last Telemedicine Visit: 5/7/2020 Liang Gibbs DO    Next Office Visit: Visit date not found  Next Telemedicine Visit: Visit date not found         Current Outpatient Medications:   •  ammonium lactate (AMLACTIN) 12 % cream, APPLY TO FOOT DAILY, Disp: , Rfl:   •  baclofen (LIORESAL) 10 mg tablet, Take 1 tablet (10 mg total) by mouth 2 (two) times a day. (Patient not taking: Reported on 6/10/2020 ), Disp: 180 tablet, Rfl: 1  •  carisoprodol (SOMA) 350 mg tablet, Take 350 mg by mouth as needed for muscle spasms., Disp: , Rfl:   •  cloNIDine (CATAPRES) 0.1 mg tablet, Take 1 tablet (0.1 mg total) by mouth daily., Disp: 90 tablet, Rfl: 0  •  LORazepam (ATIVAN) 0.5 mg tablet, Take 1 tablet (0.5 mg total) by mouth 3 (three) times a day., Disp: 90 tablet, Rfl: 1  •  melatonin 5 mg tablet, Take 5 mg by mouth nightly.  , Disp: , Rfl:   •  multivitamin (THERAGRAN) tablet, take 1 tablet by oral route  every day, Disp: , Rfl:   •  NOT IN DATABASE, Patient on a antibiotic bid , not sure of the name, Disp: , Rfl:   •  omeprazole (PriLOSEC) 40 mg capsule, Take 1 capsule (40 mg total) by mouth daily before breakfast., Disp: 90 capsule, Rfl: 1  •  ondansetron ODT (ZOFRAN-ODT) 4 mg disintegrating tablet, Take 1 tablet (4 mg total) by mouth every 8 (eight) hours as needed for nausea or vomiting. (Patient not taking: Reported on 6/10/2020 ), Disp: 12 tablet, Rfl: 0  •  oxyCODONE-acetaminophen (PERCOCET) 5-325 mg per tablet, Take 1 tablet by mouth every 6 (six) hours as needed for moderate pain. Initial treatment.  NO driving or operating heavy machinery, Disp: 5 tablet, Rfl: 0  •  PARoxetine (PAXIL) 10 mg tablet, Take 1 tablet (10 mg total) by mouth daily., Disp: 90 tablet, Rfl: 3  •  traZODone (DESYREL) 100 mg tablet, Take two tablets at night., Disp: 180 tablet, Rfl: 0      BP Readings from Last 3 Encounters:   06/10/20 120/78   11/04/19 (!) 141/74   11/04/19 124/75        Recent Lab results:  No results found for: CHOL, No results found for: HDL, No results found for: LDLCALC, No results found for: TRIG     Lab Results   Component Value Date    GLUCOSE 105 (H) 11/04/2019   , No results found for: HGBA1C      Lab Results   Component Value Date    CREATININE 0.7 11/04/2019       No results found for: TSH

## 2020-08-10 RX ORDER — LORAZEPAM 0.5 MG/1
0.5 TABLET ORAL 3 TIMES DAILY
Qty: 90 TABLET | Refills: 0 | Status: SHIPPED | OUTPATIENT
Start: 2020-08-10 | End: 2020-09-14

## 2020-08-10 NOTE — TELEPHONE ENCOUNTER
Medicine Refill Request    Last Office Visit: Visit date not found  Last Telemedicine Visit: 5/7/2020 Liang Gibbs, DO    Next Office Visit: Visit date not found  Next Telemedicine Visit: Visit date not found         Current Outpatient Medications:   •  ammonium lactate (AMLACTIN) 12 % cream, APPLY TO FOOT DAILY, Disp: , Rfl:   •  baclofen (LIORESAL) 10 mg tablet, Take 1 tablet (10 mg total) by mouth 2 (two) times a day. (Patient not taking: Reported on 6/10/2020 ), Disp: 180 tablet, Rfl: 1  •  carisoprodol (SOMA) 350 mg tablet, Take 350 mg by mouth as needed for muscle spasms., Disp: , Rfl:   •  cloNIDine (CATAPRES) 0.1 mg tablet, Take 1 tablet (0.1 mg total) by mouth daily., Disp: 90 tablet, Rfl: 0  •  HYDROcodone-acetaminophen (NORCO) 5-325 mg per tablet, Take 1 tablet by mouth 2 (two) times a day as needed for moderate pain or severe pain., Disp: 30 tablet, Rfl: 0  •  LORazepam (ATIVAN) 0.5 mg tablet, Take 1 tablet (0.5 mg total) by mouth 3 (three) times a day., Disp: 90 tablet, Rfl: 1  •  melatonin 5 mg tablet, Take 5 mg by mouth nightly.  , Disp: , Rfl:   •  multivitamin (THERAGRAN) tablet, take 1 tablet by oral route  every day, Disp: , Rfl:   •  NOT IN DATABASE, Patient on a antibiotic bid , not sure of the name, Disp: , Rfl:   •  omeprazole (PriLOSEC) 40 mg capsule, Take 1 capsule (40 mg total) by mouth daily before breakfast., Disp: 90 capsule, Rfl: 1  •  ondansetron ODT (ZOFRAN-ODT) 4 mg disintegrating tablet, Take 1 tablet (4 mg total) by mouth every 8 (eight) hours as needed for nausea or vomiting. (Patient not taking: Reported on 6/10/2020 ), Disp: 12 tablet, Rfl: 0  •  oxyCODONE-acetaminophen (PERCOCET) 5-325 mg per tablet, Take 1 tablet by mouth every 6 (six) hours as needed for moderate pain. Initial treatment.  NO driving or operating heavy machinery, Disp: 5 tablet, Rfl: 0  •  PARoxetine (PAXIL) 10 mg tablet, Take 1 tablet (10 mg total) by mouth daily., Disp: 90 tablet, Rfl: 3  •  traZODone  (DESYREL) 100 mg tablet, Take two tablets at night., Disp: 180 tablet, Rfl: 0      BP Readings from Last 3 Encounters:   06/10/20 120/78   11/04/19 (!) 141/74   11/04/19 124/75       Recent Lab results:  No results found for: CHOL, No results found for: HDL, No results found for: LDLCALC, No results found for: TRIG     Lab Results   Component Value Date    GLUCOSE 105 (H) 11/04/2019   , No results found for: HGBA1C      Lab Results   Component Value Date    CREATININE 0.7 11/04/2019       No results found for: TSH

## 2020-08-11 ENCOUNTER — TRANSCRIBE ORDERS (OUTPATIENT)
Dept: OCCUPATIONAL THERAPY | Facility: HOSPITAL | Age: 55
End: 2020-08-11

## 2020-08-11 DIAGNOSIS — M65.822 OTHER SYNOVITIS AND TENOSYNOVITIS, LEFT UPPER ARM: Primary | ICD-10-CM

## 2020-08-13 ENCOUNTER — CLINICAL SUPPORT (OUTPATIENT)
Dept: LAB | Age: 55
End: 2020-08-13
Attending: INTERNAL MEDICINE
Payer: COMMERCIAL

## 2020-08-13 DIAGNOSIS — Z01.818 ENCOUNTER FOR OTHER PREPROCEDURAL EXAMINATION: ICD-10-CM

## 2020-08-13 PROCEDURE — U0002 COVID-19 LAB TEST NON-CDC: HCPCS

## 2020-08-14 LAB — SARS-COV-2 RNA RESP QL NAA+PROBE: NOT DETECTED

## 2020-08-14 NOTE — RESULT ENCOUNTER NOTE
Patient had COVID swab for PAT. Result routed to the surgeon, Dr. Mayes, via fax on 8/14/2020 at 1601.

## 2020-08-19 ENCOUNTER — HOSPITAL ENCOUNTER (OUTPATIENT)
Dept: OCCUPATIONAL THERAPY | Facility: HOSPITAL | Age: 55
Setting detail: THERAPIES SERIES
Discharge: HOME | End: 2020-08-19
Attending: ORTHOPAEDIC SURGERY
Payer: COMMERCIAL

## 2020-08-19 DIAGNOSIS — M65.822 OTHER SYNOVITIS AND TENOSYNOVITIS, LEFT UPPER ARM: Primary | ICD-10-CM

## 2020-08-19 PROCEDURE — 97010 HOT OR COLD PACKS THERAPY: CPT | Mod: GO

## 2020-08-19 PROCEDURE — 97110 THERAPEUTIC EXERCISES: CPT | Mod: GO

## 2020-08-19 PROCEDURE — 97124 MASSAGE THERAPY: CPT | Mod: GO

## 2020-08-19 NOTE — OP OT TREATMENT LOG
Total Time for Session 8-22 Minutes   Modalities Comment Time Done Today Group   Paraffin       Fluidotherapy       Ultrasound       Hot Pack  15 y                   Total Time for Session 8-22 Minutes   Massage Comment Time Done Today   Retrograde      Scar  15 y   Soft Tissue Massage             Total Time for Session 8-22 Minutes   Therapeutic Exercise Comment Time Done Today   AROM      PROM      AAROM  15 y   TGE      Blocking Exercise                   Total Time for Session Not performed   Therapeutic Activity Comment Sets Reps Time Done Today Group   BTE         Peg Feeding - DLM         Velcro checkers         Clothespin/ruler         Gripper/ 1” blocks         [place] discrimination         Rice discrimination         [place] Transfer         O'Santos tweezer         O'Santos pegboard         Grooved pegboard         Wrist Roll         Theraputty         [place] box         Pipe Assembly - PVC         Pipe assembly - metal         Connect a link         MRMT         Puzzles         Playing cards         Blue water pump         Washer/ dowel         Pegboard Activities         Toothpicks         [place] Table - Resistive prehension Bench         Dumbbell weights         Cuff weights         Juxacisor         [place] Board         [place] Assortment         Box/Blocks         Pop beads         Hammer Exercises

## 2020-08-19 NOTE — PROGRESS NOTES
OT DAILY NOTE FOR OUTPATIENT THERAPY    Patient: Debbei Kowalski   MRN: [id number]  : 1965 54 y.o.  Referring Physician: Jatin Salazar, *  Date of Visit: 2020      Certification Dates: 20 through 10/23/20    Diagnosis:   1. Other synovitis and tenosynovitis, left upper arm        Chief Complaints:   Decreased ROM    Precautions:  Precautions comments: DASH - 81.82  Existing Precautions/Restrictions: other (see comments)(no strengthening until 8 weeks post)    TODAY'S VISIT    History/Vitals/Pain/Encounter Info - 20 1652        Injury History/Precautions/Daily Required Info    Document Type  daily treatment     Primary Therapist  OT     Chief Complaint/Reason for Visit   Decreased ROM     Onset of Illness/Injury or Date of Surgery  19     Referring Physician  Dr. Salazar     Existing Precautions/Restrictions  other (see comments)    no strengthening until 8 weeks post    Precautions comments  DASH - 81.82         Daily Treatment Assessment and Plan - 20 1644        Daily Treatment Assessment and Plan    Progress toward goals  Progressing     Daily Outcome Summary  Patient arrived wearing splint but said MD would like her to D/C use.  ROM with HEP for left non dominant hand.     Plan and Recommendations  Continue heat , massage ROM            OBJECTIVE DATA TAKEN TODAY      Skin and Sensation   Skin and Sensation - 20 1648        OTHER    Skin Assessment/Comments  Incision is well healed        Sensory Assessment (Somatosensory)    Sensory Assessment (Somatosensory)  left UE     Left UE Sensory Assessment  impaired    neuropathy digit tips        Range of Motion    PROM/AROM - 20 1600        LEFT: Upper Extremity AROM Assessment    Forearm Supination Deficit  5 degrees     Forearm Pronation Deficit  40 degrees     Wrist Flexion Deficit  40 degrees     Wrist Extension Deficit  30 degrees     Wrist Ulnar Deviation Deficit  15 degrees     Wrist Radial  Deviation Deficit  8 degrees     Left hand AROM:   WFL           Today's Treatment:             Total Time for Session 8-22 Minutes   Modalities Comment Time Done Today Group   Paraffin       Fluidotherapy       Ultrasound       Hot Pack  15 y                   Total Time for Session 8-22 Minutes   Massage Comment Time Done Today   Retrograde      Scar  15 y   Soft Tissue Massage             Total Time for Session 8-22 Minutes   Therapeutic Exercise Comment Time Done Today   AROM      PROM      AAROM  15 y   TGE      Blocking Exercise                   Total Time for Session Not performed   Therapeutic Activity Comment Sets Reps Time Done Today Group   BTE         Peg Feeding - DLM         Velcro checkers         Clothespin/ruler         Gripper/ 1” blocks         Corn discrimination         Rice discrimination         [place] Transfer         O'Santos tweezer         O'Santos pegboard         Grooved pegboard         Wrist Roll         Theraputty         [place] box         Pipe Assembly - PVC         Pipe assembly - metal         Connect a link         MRMT         Puzzles         Playing cards         Blue water pump         Washer/ dowel         Pegboard Activities         Toothpicks         Serial Knob Table - Resistive prehension Bench         Dumbbell weights         Cuff weights         Juxacisor         [place] Board         [place] Assortment         Box/Blocks         Pop beads         Hammer Exercises

## 2020-08-24 RX ORDER — AMMONIUM LACTATE 12 G/100G
CREAM TOPICAL AS NEEDED
COMMUNITY
End: 2020-08-24 | Stop reason: SDUPTHER

## 2020-08-24 RX ORDER — AMMONIUM LACTATE 12 G/100G
CREAM TOPICAL AS NEEDED
Qty: 385 G | Refills: 0 | Status: SHIPPED | OUTPATIENT
Start: 2020-08-24

## 2020-08-24 NOTE — TELEPHONE ENCOUNTER
amlactin 12%    [facility] [phone]

## 2020-08-24 NOTE — TELEPHONE ENCOUNTER
Medicine Refill Request    Last Office Visit: Visit date not found  Last Telemedicine Visit: 5/7/2020 Liang Gibbs,     Next Office Visit: Visit date not found  Next Telemedicine Visit: Visit date not found         Current Outpatient Medications:   •  ammonium lactate (AMLACTIN) 12 % cream, Apply topically as needed., Disp: , Rfl:   •  ammonium lactate (AMLACTIN) 12 % cream, APPLY TO FOOT DAILY, Disp: , Rfl:   •  baclofen (LIORESAL) 10 mg tablet, Take 1 tablet (10 mg total) by mouth 2 (two) times a day. (Patient not taking: Reported on 6/10/2020 ), Disp: 180 tablet, Rfl: 1  •  carisoprodol (SOMA) 350 mg tablet, Take 350 mg by mouth as needed for muscle spasms., Disp: , Rfl:   •  cloNIDine (CATAPRES) 0.1 mg tablet, Take 1 tablet (0.1 mg total) by mouth daily., Disp: 90 tablet, Rfl: 0  •  HYDROcodone-acetaminophen (NORCO) 5-325 mg per tablet, Take 1 tablet by mouth 2 (two) times a day as needed for moderate pain or severe pain., Disp: 30 tablet, Rfl: 0  •  LORazepam (ATIVAN) 0.5 mg tablet, Take 1 tablet (0.5 mg total) by mouth 3 (three) times a day., Disp: 90 tablet, Rfl: 0  •  melatonin 5 mg tablet, Take 5 mg by mouth nightly.  , Disp: , Rfl:   •  multivitamin (THERAGRAN) tablet, take 1 tablet by oral route  every day, Disp: , Rfl:   •  NOT IN DATABASE, Patient on a antibiotic bid , not sure of the name, Disp: , Rfl:   •  omeprazole (PriLOSEC) 40 mg capsule, Take 1 capsule (40 mg total) by mouth daily before breakfast., Disp: 90 capsule, Rfl: 1  •  ondansetron ODT (ZOFRAN-ODT) 4 mg disintegrating tablet, Take 1 tablet (4 mg total) by mouth every 8 (eight) hours as needed for nausea or vomiting. (Patient not taking: Reported on 6/10/2020 ), Disp: 12 tablet, Rfl: 0  •  oxyCODONE-acetaminophen (PERCOCET) 5-325 mg per tablet, Take 1 tablet by mouth every 6 (six) hours as needed for moderate pain. Initial treatment.  NO driving or operating heavy machinery, Disp: 5 tablet, Rfl: 0  •  PARoxetine (PAXIL) 10 mg tablet,  Take 1 tablet (10 mg total) by mouth daily., Disp: 90 tablet, Rfl: 3  •  traZODone (DESYREL) 100 mg tablet, Take two tablets at night., Disp: 180 tablet, Rfl: 0      BP Readings from Last 3 Encounters:   06/10/20 120/78   11/04/19 (!) 141/74   11/04/19 124/75       Recent Lab results:  No results found for: CHOL, No results found for: HDL, No results found for: LDLCALC, No results found for: TRIG     Lab Results   Component Value Date    GLUCOSE 105 (H) 11/04/2019   , No results found for: HGBA1C      Lab Results   Component Value Date    CREATININE 0.7 11/04/2019       No results found for: TSH

## 2020-08-26 ENCOUNTER — HOSPITAL ENCOUNTER (OUTPATIENT)
Dept: OCCUPATIONAL THERAPY | Facility: HOSPITAL | Age: 55
Setting detail: THERAPIES SERIES
Discharge: HOME | End: 2020-08-26
Attending: ORTHOPAEDIC SURGERY
Payer: COMMERCIAL

## 2020-08-26 DIAGNOSIS — M65.822 OTHER SYNOVITIS AND TENOSYNOVITIS, LEFT UPPER ARM: Primary | ICD-10-CM

## 2020-08-26 PROCEDURE — 97110 THERAPEUTIC EXERCISES: CPT | Mod: GO

## 2020-08-26 PROCEDURE — 97124 MASSAGE THERAPY: CPT | Mod: GO

## 2020-08-26 PROCEDURE — 97010 HOT OR COLD PACKS THERAPY: CPT | Mod: GO

## 2020-08-26 NOTE — PROGRESS NOTES
OT DAILY NOTE FOR OUTPATIENT THERAPY    Patient: Debbie Kwoalski   MRN: [id number]  : 1965 54 y.o.  Referring Physician: Jatin Salazar, *  Date of Visit: 2020      Certification Dates: 20 through 10/23/20    Diagnosis:   1. Other synovitis and tenosynovitis, left upper arm        Chief Complaints:   Decreased ROM    Precautions:  Existing Precautions/Restrictions: other (see comments)    TODAY'S VISIT    History/Vitals/Pain/Encounter Info - 20 1355        Injury History/Precautions/Daily Required Info    Document Type  daily treatment     Primary Therapist  OT     Chief Complaint/Reason for Visit   Decreased ROM     Onset of Illness/Injury or Date of Surgery  19     Referring Physician  Dr. Salazar     Existing Precautions/Restrictions  other (see comments)     Patient/Family/Caregiver Comments/Observations  Patient still lacks AROM of left wrist     Start Time  1100     Stop Time  1145     Time Calculation (min)  45 min     Patient reported fall since last visit  No        Pain Assessment    Currently in pain  No/Denies        Pain Interventions    Intervention   heat     Post Intervention Comments  deny         Daily Treatment Assessment and Plan - 20 1357        Daily Treatment Assessment and Plan    Progress toward goals  Progressing     Daily Outcome Summary  Patient noting increase in ROM     Plan and Recommendations  Continue heat , massage ROM            Today's Treatment:             Total Time for Session 8-22 Minutes   Modalities Comment Time Done Today Group   Paraffin       Fluidotherapy       Ultrasound       Hot Pack  15 y                   Total Time for Session 8-22 Minutes   Massage Comment Time Done Today   Retrograde      Scar  15 y   Soft Tissue Massage             Total Time for Session 8-22 Minutes   Therapeutic Exercise Comment Time Done Today   AROM      PROM      AAROM  15 y   TGE      Blocking Exercise                   Total Time for  Session Not performed   Therapeutic Activity Comment Sets Reps Time Done Today Group   BTE         Peg Feeding - DLM         Velcro checkers         Clothespin/ruler         Gripper/ 1” blocks         Corn discrimination         Rice discrimination         [place] Transfer         O'Santos tweezkhurram         O'Santos pegboard         Grooved pegboard         Wrist Roll         Theraputty         [place] box         Pipe Assembly - PVC         Pipe assembly - metal         Connect a link         MRMT         Puzzles         Playing cards         Blue water pump         Washer/ dowel         Pegboard Activities         Toothpicks         Serial Knob Table - Resistive prehension Bench         Dumbbell weights         Cuff weights         Juxacisor         [place] Board         [place] Assortment         Box/Blocks         Pop beads         Hammer Exercises

## 2020-08-26 NOTE — OP OT TREATMENT LOG
Total Time for Session 8-22 Minutes   Modalities Comment Time Done Today Group   Paraffin       Fluidotherapy       Ultrasound       Hot Pack  15 y                   Total Time for Session 8-22 Minutes   Massage Comment Time Done Today   Retrograde      Scar  15 y   Soft Tissue Massage             Total Time for Session 8-22 Minutes   Therapeutic Exercise Comment Time Done Today   AROM      PROM      AAROM  15 y   TGE      Blocking Exercise                   Total Time for Session Not performed   Therapeutic Activity Comment Sets Reps Time Done Today Group   BTE         Peg Feeding - DLM         Velcro checkers         Clothespin/ruler         Gripper/ 1” blocks         Corn discrimination         Rice discrimination         [place] Transfer         O'Santos tweezer         O'Santos pegboard         Grooved pegboard         Wrist Roll         Theraputty         [place] box         Pipe Assembly - PVC         Pipe assembly - metal         Connect a link         MRMT         Puzzles         Playing cards         Blue water pump         Washer/ dowel         Pegboard Activities         Toothpicks         Serial Knob Table - Resistive prehension Bench         Dumbbell weights         Cuff weights         Juxacisor         [place] Board         [place] Assortment         Box/Blocks         Pop beads         Hammer Exercises

## 2020-08-31 ENCOUNTER — HOSPITAL ENCOUNTER (OUTPATIENT)
Dept: OCCUPATIONAL THERAPY | Facility: HOSPITAL | Age: 55
Setting detail: THERAPIES SERIES
Discharge: HOME | End: 2020-08-31
Attending: ORTHOPAEDIC SURGERY
Payer: COMMERCIAL

## 2020-08-31 DIAGNOSIS — M65.822 OTHER SYNOVITIS AND TENOSYNOVITIS, LEFT UPPER ARM: Primary | ICD-10-CM

## 2020-08-31 PROCEDURE — 97110 THERAPEUTIC EXERCISES: CPT | Mod: GO

## 2020-08-31 PROCEDURE — 97010 HOT OR COLD PACKS THERAPY: CPT | Mod: GO

## 2020-08-31 PROCEDURE — 97124 MASSAGE THERAPY: CPT | Mod: GO

## 2020-09-02 ENCOUNTER — HOSPITAL ENCOUNTER (OUTPATIENT)
Dept: OCCUPATIONAL THERAPY | Facility: HOSPITAL | Age: 55
Setting detail: THERAPIES SERIES
Discharge: HOME | End: 2020-09-02
Attending: ORTHOPAEDIC SURGERY
Payer: COMMERCIAL

## 2020-09-02 DIAGNOSIS — M65.822 OTHER SYNOVITIS AND TENOSYNOVITIS, LEFT UPPER ARM: Primary | ICD-10-CM

## 2020-09-02 PROCEDURE — 97124 MASSAGE THERAPY: CPT | Mod: GO

## 2020-09-02 PROCEDURE — 97110 THERAPEUTIC EXERCISES: CPT | Mod: GO

## 2020-09-02 PROCEDURE — 97010 HOT OR COLD PACKS THERAPY: CPT | Mod: GO

## 2020-09-02 NOTE — OP OT TREATMENT LOG
Total Time for Session 8-22 Minutes   Modalities Comment Time Done Today Group   Paraffin       Fluidotherapy       Ultrasound       Hot Pack  15 y                   Total Time for Session 8-22 Minutes   Massage Comment Time Done Today   Retrograde      Scar  15 y   Soft Tissue Massage             Total Time for Session 8-22 Minutes   Therapeutic Exercise Comment Time Done Today   AROM      PROM      AAROM  15 y   TGE      Blocking Exercise                   Total Time for Session Not performed   Therapeutic Activity Comment Sets Reps Time Done Today Group   BTE         Peg Feeding - DLM         Velcro checkers         Clothespin/ruler         Gripper/ 1” blocks         Corn discrimination         Rice discrimination         [place] Transfer         O'Santos tweezer         O'Santos pegboard         Grooved pegboard         Wrist Roll         Theraputty         [place] box         Pipe Assembly - PVC         Pipe assembly - metal         Connect a link         MRMT         Puzzles         Playing cards         Blue water pump         Washer/ dowel         Pegboard Activities         Toothpicks         Serial Knob Table - Resistive prehension Bench         Dumbbell weights         Cuff weights         Juxacisor         [place] Board         [place] Assortment         Box/Blocks         Pop beads         Hammer Exercises

## 2020-09-02 NOTE — PROGRESS NOTES
OT DAILY NOTE FOR OUTPATIENT THERAPY    Patient: Debbie Kowalski   MRN: [id number]  : 1965 54 y.o.  Referring Physician: Jatin Salazar, *  Date of Visit: 2020      Certification Dates: 20 through 10/23/20    Diagnosis:   1. Other synovitis and tenosynovitis, left upper arm        Chief Complaints:   Decreased ROM    Precautions:  Existing Precautions/Restrictions: other (see comments)    TODAY'S VISIT    History/Vitals/Pain/Encounter Info - 20 1354        Injury History/Precautions/Daily Required Info    Document Type  daily treatment     Primary Therapist  OT     Chief Complaint/Reason for Visit   Decreased ROM     Onset of Illness/Injury or Date of Surgery  19     Referring Physician  Dr. Salazar     Existing Precautions/Restrictions  other (see comments)     Patient/Family/Caregiver Comments/Observations  Patient notes increased ROM     Start Time  1000     Stop Time  1100     Time Calculation (min)  60 min     Patient reported fall since last visit  No        Pain Assessment    Currently in pain  No/Denies        Pain Interventions    Intervention   heat     Post Intervention Comments  deny         Daily Treatment Assessment and Plan - 20 1356        Daily Treatment Assessment and Plan    Progress toward goals  Progressing     Daily Outcome Summary  Increase in ROM     Plan and Recommendations  Continue heat , massage ROM              Today's Treatment:             Total Time for Session 8-22 Minutes   Modalities Comment Time Done Today Group   Paraffin       Fluidotherapy       Ultrasound       Hot Pack  15 y                   Total Time for Session 8-22 Minutes   Massage Comment Time Done Today   Retrograde      Scar  15 y   Soft Tissue Massage             Total Time for Session 8-22 Minutes   Therapeutic Exercise Comment Time Done Today   AROM      PROM      AAROM  15 y   TGE      Blocking Exercise                   Total Time for Session Not performed    Therapeutic Activity Comment Sets Reps Time Done Today Group   BTE         Peg Feeding - DLM         Velcro checkers         Clothespin/ruler         Gripper/ 1” blocks         Corn discrimination         Rice discrimination         [place] Transfer         O'Santos tweezkhurram         O'Santos pegboard         Grooved pegboard         Wrist Roll         Theraputty         [place] box         Pipe Assembly - PVC         Pipe assembly - metal         Connect a link         MRMT         Puzzles         Playing cards         Blue water pump         Washer/ dowel         Pegboard Activities         Toothpicks         Serial Knob Table - Resistive prehension Bench         Dumbbell weights         Cuff weights         Juxacisor         [place] Board         [place] Assortment         Box/Blocks         Pop beads         Hammer Exercises

## 2020-09-12 DIAGNOSIS — F32.A DEPRESSION, UNSPECIFIED DEPRESSION TYPE: ICD-10-CM

## 2020-09-12 DIAGNOSIS — F41.9 ANXIETY: Primary | ICD-10-CM

## 2020-09-14 RX ORDER — LORAZEPAM 0.5 MG/1
0.5 TABLET ORAL 3 TIMES DAILY
Qty: 90 TABLET | Refills: 0 | Status: SHIPPED | OUTPATIENT
Start: 2020-09-14 | End: 2020-09-26 | Stop reason: SDUPTHER

## 2020-09-14 NOTE — TELEPHONE ENCOUNTER
Medicine Refill Request    Last Office Visit: Visit date not found  Last Telemedicine Visit: 5/7/2020 Liang Gibbs,     Next Office Visit: Visit date not found  Next Telemedicine Visit: Visit date not found     Message was left for patient to schedule med check appointment    Current Outpatient Medications:   •  ammonium lactate (AMLACTIN) 12 % cream, APPLY TO FOOT DAILY, Disp: , Rfl:   •  ammonium lactate (AMLACTIN) 12 % cream, Apply topically as needed for dry skin., Disp: 385 g, Rfl: 0  •  baclofen (LIORESAL) 10 mg tablet, Take 1 tablet (10 mg total) by mouth 2 (two) times a day. (Patient not taking: Reported on 6/10/2020 ), Disp: 180 tablet, Rfl: 1  •  carisoprodol (SOMA) 350 mg tablet, Take 350 mg by mouth as needed for muscle spasms., Disp: , Rfl:   •  cloNIDine (CATAPRES) 0.1 mg tablet, Take 1 tablet (0.1 mg total) by mouth daily., Disp: 90 tablet, Rfl: 0  •  LORazepam (ATIVAN) 0.5 mg tablet, Take 1 tablet (0.5 mg total) by mouth 3 (three) times a day., Disp: 90 tablet, Rfl: 0  •  melatonin 5 mg tablet, Take 5 mg by mouth nightly.  , Disp: , Rfl:   •  multivitamin (THERAGRAN) tablet, take 1 tablet by oral route  every day, Disp: , Rfl:   •  NOT IN DATABASE, Patient on a antibiotic bid , not sure of the name, Disp: , Rfl:   •  omeprazole (PriLOSEC) 40 mg capsule, Take 1 capsule (40 mg total) by mouth daily before breakfast., Disp: 90 capsule, Rfl: 1  •  ondansetron ODT (ZOFRAN-ODT) 4 mg disintegrating tablet, Take 1 tablet (4 mg total) by mouth every 8 (eight) hours as needed for nausea or vomiting. (Patient not taking: Reported on 6/10/2020 ), Disp: 12 tablet, Rfl: 0  •  oxyCODONE-acetaminophen (PERCOCET) 5-325 mg per tablet, Take 1 tablet by mouth every 6 (six) hours as needed for moderate pain. Initial treatment.  NO driving or operating heavy machinery, Disp: 5 tablet, Rfl: 0  •  PARoxetine (PAXIL) 10 mg tablet, Take 1 tablet (10 mg total) by mouth daily., Disp: 90 tablet, Rfl: 3  •  traZODone (DESYREL)  100 mg tablet, Take two tablets at night., Disp: 180 tablet, Rfl: 0      BP Readings from Last 3 Encounters:   06/10/20 120/78   11/04/19 (!) 141/74   11/04/19 124/75       Recent Lab results:  No results found for: CHOL, No results found for: HDL, No results found for: LDLCALC, No results found for: TRIG     Lab Results   Component Value Date    GLUCOSE 105 (H) 11/04/2019   , No results found for: HGBA1C      Lab Results   Component Value Date    CREATININE 0.7 11/04/2019       No results found for: TSH

## 2020-09-21 ENCOUNTER — HOSPITAL ENCOUNTER (OUTPATIENT)
Dept: OCCUPATIONAL THERAPY | Facility: HOSPITAL | Age: 55
Setting detail: THERAPIES SERIES
Discharge: HOME | End: 2020-09-21
Attending: ORTHOPAEDIC SURGERY
Payer: COMMERCIAL

## 2020-09-21 DIAGNOSIS — M65.822 OTHER SYNOVITIS AND TENOSYNOVITIS, LEFT UPPER ARM: Primary | ICD-10-CM

## 2020-09-21 PROCEDURE — 97010 HOT OR COLD PACKS THERAPY: CPT | Mod: GO

## 2020-09-21 PROCEDURE — 97110 THERAPEUTIC EXERCISES: CPT | Mod: GO

## 2020-09-21 PROCEDURE — 97530 THERAPEUTIC ACTIVITIES: CPT | Mod: GO

## 2020-09-21 PROCEDURE — 97124 MASSAGE THERAPY: CPT | Mod: GO

## 2020-09-21 NOTE — PROGRESS NOTES
OT DAILY NOTE FOR OUTPATIENT THERAPY    Patient: Debbie Kowalski   MRN: [id number]  : 1965 54 y.o.  Referring Physician: Jatin Salazar, *  Date of Visit: 2020      Certification Dates:   through      Diagnosis:   1. Other synovitis and tenosynovitis, left upper arm        Chief Complaints:   Decrease ROM and strength    Precautions:  Existing Precautions/Restrictions: no known precautions/restrictions    TODAY'S VISIT    History/Vitals/Pain/Encounter Info - 20 1623        Injury History/Precautions/Daily Required Info    Document Type  daily treatment     Primary Therapist  OT     Chief Complaint/Reason for Visit   Decrease ROM and strength     Existing Precautions/Restrictions  no known precautions/restrictions     Start Time  1000     Stop Time  1100     Time Calculation (min)  60 min     Patient reported fall since last visit  No        Pain Assessment    Currently in pain  No/Denies        Pain Interventions    Intervention   heat     Post Intervention Comments  deny         Daily Treatment Assessment and Plan - 20 1639        Daily Treatment Assessment and Plan    Progress toward goals  Progressing     Daily Outcome Summary  Pt. noting raised area on ulnar styloid that she says is a cyst.     Plan and Recommendations  Continue program             Today's Treatment:             Total Time for Session 8-22 Minutes   Modalities Comment Time Done Today Group   Paraffin       Fluidotherapy       Ultrasound       Hot Pack  15 y                   Total Time for Session 8-22 Minutes   Massage Comment Time Done Today   Retrograde      Scar      Soft Tissue Massage  15 y          Total Time for Session 8-22 Minutes   Therapeutic Exercise Comment Time Done Today   AROM      PROM      AAROM  15 y   TGE      Blocking Exercise                   Total Time for Session 8-22 Minutes   Therapeutic Activity Comment Sets Reps Time Done Today Group   BTE         Peg Feeding - DLM         Velcro  checkers         Clothespin/ruler    15 y    Gripper/ 1” blocks         Corn discrimination         Rice discrimination         [place] Transfer         O'Santos tweezer         O'Santos pegboard         Grooved pegboard         Wrist Roll         Theraputty         [place] box         Pipe Assembly - PVC         Pipe assembly - metal         Connect a link         MRMT         Puzzles         Playing cards         Blue water pump         Washer/ dowel         Pegboard Activities         Toothpicks         Serial Knob Table - Resistive prehension Bench         Dumbbell weights         Cuff weights         Juxacisor         [place] Board         [place] Assortment         Box/Blocks         Pop beads         Hammer Exercises

## 2020-09-21 NOTE — OP OT TREATMENT LOG
Total Time for Session 8-22 Minutes   Modalities Comment Time Done Today Group   Paraffin       Fluidotherapy       Ultrasound       Hot Pack  15 y                   Total Time for Session 8-22 Minutes   Massage Comment Time Done Today   Retrograde      Scar      Soft Tissue Massage  15 y          Total Time for Session 8-22 Minutes   Therapeutic Exercise Comment Time Done Today   AROM      PROM      AAROM  15 y   TGE      Blocking Exercise                   Total Time for Session 8-22 Minutes   Therapeutic Activity Comment Sets Reps Time Done Today Group   BTE         Peg Feeding - DLM         Velcro checkers         Clothespin/ruler    15 y    Gripper/ 1” blocks         Corn discrimination         Rice discrimination         [place] Transfer         O'Santos tweezer         O'Santos pegboard         Grooved pegboard         Wrist Roll         Theraputty         [place] box         Pipe Assembly - PVC         Pipe assembly - metal         Connect a link         MRMT         Puzzles         Playing cards         Blue water pump         Washer/ dowel         Pegboard Activities         Toothpicks         Serial Knob Table - Resistive prehension Bench         Dumbbell weights         Cuff weights         Juxacisor         [place] Board         [place] Assortment         Box/Blocks         Pop beads         Hammer Exercises

## 2020-09-23 ENCOUNTER — HOSPITAL ENCOUNTER (OUTPATIENT)
Dept: OCCUPATIONAL THERAPY | Facility: HOSPITAL | Age: 55
Setting detail: THERAPIES SERIES
Discharge: HOME | End: 2020-09-23
Attending: ORTHOPAEDIC SURGERY
Payer: COMMERCIAL

## 2020-09-23 DIAGNOSIS — M65.822 OTHER SYNOVITIS AND TENOSYNOVITIS, LEFT UPPER ARM: Primary | ICD-10-CM

## 2020-09-23 PROCEDURE — 97124 MASSAGE THERAPY: CPT | Mod: GO

## 2020-09-23 PROCEDURE — 97010 HOT OR COLD PACKS THERAPY: CPT | Mod: GO

## 2020-09-23 PROCEDURE — 97530 THERAPEUTIC ACTIVITIES: CPT | Mod: GO

## 2020-09-23 PROCEDURE — 97110 THERAPEUTIC EXERCISES: CPT | Mod: GO

## 2020-09-23 NOTE — PROGRESS NOTES
OT DAILY NOTE FOR OUTPATIENT THERAPY    Patient: Debbie Kowalski   MRN: [id number]  : 1965 54 y.o.  Referring Physician: Jatin Salazar, *  Date of Visit: 2020      Certification Dates: 20 through 20    Diagnosis:   1. Other synovitis and tenosynovitis, left upper arm        Chief Complaints:   Decrease ROM and strength    Precautions:  Existing Precautions/Restrictions: no known precautions/restrictions    TODAY'S VISIT    History/Vitals/Pain/Encounter Info - 20 1621        Injury History/Precautions/Daily Required Info    Document Type  daily treatment     Primary Therapist  OT     Chief Complaint/Reason for Visit   Decrease ROM and strength     Onset of Illness/Injury or Date of Surgery  19     Referring Physician  Dr. Salazar     Existing Precautions/Restrictions  no known precautions/restrictions     Patient/Family/Caregiver Comments/Observations  Patient seen by the doctor and referred for more therapy     Start Time  1400     Stop Time  1500     Time Calculation (min)  60 min     Patient reported fall since last visit  No        Pain Assessment    Currently in pain  No/Denies        Pain Interventions    Intervention   heat     Post Intervention Comments  deny         Daily Treatment Assessment and Plan - 20 1623        Daily Treatment Assessment and Plan    Progress toward goals  Progressing     Daily Outcome Summary  Pain it ulnar side of wrist with motion     Plan and Recommendations  Continue program             Today's Treatment:             Total Time for Session 8-22 Minutes   Modalities Comment Time Done Today Group   Paraffin       Fluidotherapy       Ultrasound       Hot Pack  15 y                   Total Time for Session 8-22 Minutes   Massage Comment Time Done Today   Retrograde      Scar      Soft Tissue Massage  15 y          Total Time for Session 8-22 Minutes   Therapeutic Exercise Comment Time Done Today   AROM      PROM      AAROM  15 y    TGE      Blocking Exercise                   Total Time for Session 8-22 Minutes   Therapeutic Activity Comment Sets Reps Time Done Today Group   BTE         Peg Feeding - DLM         Velcro checkers         Clothespin/ruler    15     Gripper/ 1” blocks         Corn discrimination         Rice discrimination         [place] Transfer         O'Santos tweezer         O'Santos pegboard         Grooved pegboard         Wrist Roll    15 y    Theraputty         [place] box         Pipe Assembly - PVC         Pipe assembly - metal         Connect a link         MRMT         Puzzles         Playing cards         Blue water pump         Washer/ dowel         Pegboard Activities         Toothpicks         Serial Knob Table - Resistive prehension Bench         Dumbbell weights         Cuff weights         Juxacisor         [place] Board         [place] Assortment         Box/Blocks         Pop beads         Hammer Exercises

## 2020-09-23 NOTE — OP OT TREATMENT LOG
Total Time for Session 8-22 Minutes   Modalities Comment Time Done Today Group   Paraffin       Fluidotherapy       Ultrasound       Hot Pack  15 y                   Total Time for Session 8-22 Minutes   Massage Comment Time Done Today   Retrograde      Scar      Soft Tissue Massage  15 y          Total Time for Session 8-22 Minutes   Therapeutic Exercise Comment Time Done Today   AROM      PROM      AAROM  15 y   TGE      Blocking Exercise                   Total Time for Session 8-22 Minutes   Therapeutic Activity Comment Sets Reps Time Done Today Group   BTE         Peg Feeding - DLM         Velcro checkers         Clothespin/ruler    15     Gripper/ 1” blocks         [place] discrimination         Rice discrimination         [place] Transfer         O'Santos tweezer         O'Santos pegboard         Grooved pegboard         Wrist Roll    15 y    Theraputty         [place] box         Pipe Assembly - PVC         Pipe assembly - metal         Connect a link         MRMT         Puzzles         Playing cards         Blue water pump         Washer/ dowel         Pegboard Activities         Toothpicks         Serial Knob Table - Resistive prehension Bench         Dumbbell weights         Cuff weights         Juxacisor         [place] Board         [place] Assortment         Box/Blocks         Pop beads         Hammer Exercises

## 2020-09-25 ENCOUNTER — TELEPHONE (OUTPATIENT)
Dept: FAMILY MEDICINE | Facility: CLINIC | Age: 55
End: 2020-09-25

## 2020-09-25 DIAGNOSIS — F32.A DEPRESSION, UNSPECIFIED DEPRESSION TYPE: ICD-10-CM

## 2020-09-25 DIAGNOSIS — F41.9 ANXIETY: ICD-10-CM

## 2020-09-25 NOTE — TELEPHONE ENCOUNTER
Medicine Refill Request    Patient requesting refills on Ativan.   Patient would also like to know how often she needs to follow up.     Last Office Visit: Visit date not found  Last Telemedicine Visit: 5/7/2020 Liang Gibbs, DO    Consult 06/10/20  Next Office Visit: Visit date not found  Next Telemedicine Visit: Visit date not found

## 2020-09-26 RX ORDER — LORAZEPAM 0.5 MG/1
0.5 TABLET ORAL 3 TIMES DAILY
Qty: 90 TABLET | Refills: 0 | Status: SHIPPED | OUTPATIENT
Start: 2020-09-26 | End: 2020-10-21 | Stop reason: SDUPTHER

## 2020-10-06 ENCOUNTER — HOSPITAL ENCOUNTER (OUTPATIENT)
Dept: OCCUPATIONAL THERAPY | Facility: HOSPITAL | Age: 55
Setting detail: THERAPIES SERIES
Discharge: HOME | End: 2020-10-06
Attending: ORTHOPAEDIC SURGERY
Payer: COMMERCIAL

## 2020-10-06 DIAGNOSIS — M65.822 OTHER SYNOVITIS AND TENOSYNOVITIS, LEFT UPPER ARM: Primary | ICD-10-CM

## 2020-10-06 PROCEDURE — 97110 THERAPEUTIC EXERCISES: CPT | Mod: GO

## 2020-10-06 PROCEDURE — 97010 HOT OR COLD PACKS THERAPY: CPT | Mod: GO

## 2020-10-06 PROCEDURE — 97124 MASSAGE THERAPY: CPT | Mod: GO

## 2020-10-06 NOTE — Clinical Note
[address], [address]  [facility] [place] [address] [phone]      Dear DR. Salazar,      Thank you for this referral. Please review the attached notes and plan of care for your approval.  Please contact our department with any questions.     Sincerely,     Geovanna Franco, OT    No notes on file

## 2020-10-06 NOTE — OP OT TREATMENT LOG
Total Time for Session 8-22 Minutes   Modalities Comment Time Done Today Group   Paraffin       Fluidotherapy       Ultrasound       Hot Pack  15 y                   Total Time for Session 8-22 Minutes   Massage Comment Time Done Today   Retrograde      Scar      Soft Tissue Massage  15 y          Total Time for Session 8-22 Minutes   Therapeutic Exercise Comment Time Done Today   AROM      PROM      AAROM  15 y   TGE      Blocking Exercise                   Total Time for Session 8-22 Minutes   Therapeutic Activity Comment Sets Reps Time Done Today Group   BTE         Peg Feeding - DLM         Velcro checkers         Clothespin/ruler    15     Gripper/ 1” blocks         Corn discrimination         Rice discrimination         [place] Transfer         O'Santos tweezer         O'Santos pegboard         Grooved pegboard         Wrist Roll    15 y    Theraputty         [place] box         Pipe Assembly - PVC         Pipe assembly - metal         Connect a link         MRMT         Puzzles         Playing cards         Blue water pump         Washer/ dowel         Pegboard Activities         Toothpicks         Serial Knob Table - Resistive prehension Bench         Dumbbell weights         Cuff weights         Juxacisor         [place] Board         [place] Assortment         Box/Blocks         Pop beads         Hammer Exercises

## 2020-10-06 NOTE — PROGRESS NOTES
OT DAILY NOTE FOR OUTPATIENT THERAPY    Patient: Debbie Kowalski   MRN: [id number]  : 1965 54 y.o.  Referring Physician: Jatin Salazar, *  Date of Visit: 10/6/2020      Certification Dates: 20 through 20    Diagnosis:   1. Other synovitis and tenosynovitis, left upper arm        Chief Complaints:   Decrease ROM and strength    Precautions:  Existing Precautions/Restrictions: no known precautions/restrictions    TODAY'S VISIT    History/Vitals/Pain/Encounter Info - 10/06/20 1652        Injury History/Precautions/Daily Required Info    Document Type  daily treatment     Primary Therapist  OT     Chief Complaint/Reason for Visit   Decrease ROM and strength     Onset of Illness/Injury or Date of Surgery  19     Referring Physician  Dr. Salazar     Existing Precautions/Restrictions  no known precautions/restrictions     Patient/Family/Caregiver Comments/Observations  I got my adapted car today     Start Time  1000     Stop Time  1100     Time Calculation (min)  60 min     Patient reported fall since last visit  No        Pain Assessment    Currently in pain  No/Denies        Pain Interventions    Intervention   heat     Post Intervention Comments  demy                 Today's Treatment:             Total Time for Session 8-22 Minutes   Modalities Comment Time Done Today Group   Paraffin       Fluidotherapy       Ultrasound       Hot Pack  15 y                   Total Time for Session 8-22 Minutes   Massage Comment Time Done Today   Retrograde      Scar      Soft Tissue Massage  15 y          Total Time for Session 8-22 Minutes   Therapeutic Exercise Comment Time Done Today   AROM      PROM      AAROM  15 y   TGE      Blocking Exercise                   Total Time for Session 8-22 Minutes   Therapeutic Activity Comment Sets Reps Time Done Today Group   BTE         Peg Feeding - DLM         Velcro checkers         Clothespin/ruler    15     Gripper/ 1” blocks         Corn  discrimination         Rice discrimination         [place] Transfer         O'Santos tweezer         O'Santos pegboard         Grooved pegboard         Wrist Roll    15 y    Theraputty         [place] box         Pipe Assembly - PVC         Pipe assembly - metal         Connect a link         MRMT         Puzzles         Playing cards         Blue water pump         Washer/ dowel         Pegboard Activities         Toothpicks         Serial Knob Table - Resistive prehension Bench         Dumbbell weights         Cuff weights         Juxacisor         [place] Board         [place] Assortment         Box/Blocks         Pop beads         Hammer Exercises

## 2020-10-08 ENCOUNTER — HOSPITAL ENCOUNTER (OUTPATIENT)
Dept: OCCUPATIONAL THERAPY | Facility: HOSPITAL | Age: 55
Setting detail: THERAPIES SERIES
Discharge: HOME | End: 2020-10-08
Attending: ORTHOPAEDIC SURGERY
Payer: COMMERCIAL

## 2020-10-08 DIAGNOSIS — M65.822 OTHER SYNOVITIS AND TENOSYNOVITIS, LEFT UPPER ARM: Primary | ICD-10-CM

## 2020-10-08 PROCEDURE — 97110 THERAPEUTIC EXERCISES: CPT | Mod: GO

## 2020-10-08 PROCEDURE — 97530 THERAPEUTIC ACTIVITIES: CPT | Mod: GO

## 2020-10-08 PROCEDURE — 97010 HOT OR COLD PACKS THERAPY: CPT | Mod: GO

## 2020-10-08 PROCEDURE — 97124 MASSAGE THERAPY: CPT | Mod: GO

## 2020-10-08 NOTE — PROGRESS NOTES
OT DAILY NOTE FOR OUTPATIENT THERAPY    Patient: Debbie Kowalski   MRN: [id number]  : 1965 54 y.o.  Referring Physician: Jatin Salazar, *  Date of Visit: 10/8/2020      Certification Dates: 20 through 20    Diagnosis:   1. Other synovitis and tenosynovitis, left upper arm        Chief Complaints:   Decrease ROM and strength    Precautions:  Existing Precautions/Restrictions: no known precautions/restrictions    TODAY'S VISIT    History/Vitals/Pain/Encounter Info - 10/08/20 1228        Injury History/Precautions/Daily Required Info    Document Type  daily treatment     Primary Therapist  OT     Chief Complaint/Reason for Visit   Decrease ROM and strength     Onset of Illness/Injury or Date of Surgery  19     Referring Physician  Dr. Salazar     Existing Precautions/Restrictions  no known precautions/restrictions     Patient/Family/Caregiver Comments/Observations  I got my car it is great, I can get my electric wheelchair in myself     Start Time  1100     Stop Time  1200     Time Calculation (min)  60 min     Patient reported fall since last visit  No        Pain Assessment    Currently in pain  No/Denies        Pain Interventions    Intervention   heat     Post Intervention Comments  deny         Daily Treatment Assessment and Plan - 10/08/20 1230        Daily Treatment Assessment and Plan    Progress toward goals  Progressing     Daily Outcome Summary  pressure on ulnar styloid has offered some relief     Plan and Recommendations  Continue program             Today's Treatment:             Total Time for Session 8-22 Minutes   Modalities Comment Time Done Today Group   Paraffin       Fluidotherapy       Ultrasound       Hot Pack  15 y                   Total Time for Session 8-22 Minutes   Massage Comment Time Done Today   Retrograde      Scar      Soft Tissue Massage  15 y          Total Time for Session 8-22 Minutes   Therapeutic Exercise Comment Time Done Today   AROM       PROM      AAROM  15 y   TGE      Blocking Exercise                   Total Time for Session 8-22 Minutes   Therapeutic Activity Comment Sets Reps Time Done Today Group   BTE         Peg Feeding - DLM         Velcro checkers         Clothespin/ruler    15     Gripper/ 1” blocks         [place] discrimination         Rice discrimination         [place] Transfer         O'Santos tweezer         O'Santos pegboard         Grooved pegboard         Wrist Roll    15     Theraputty         [place] box         Pipe Assembly - PVC         Pipe assembly - metal         Connect a link         MRMT         Puzzles         Playing cards         Blue water pump         Washer/ dowel         Pegboard Activities         Toothpicks         Serial Knob Table - Resistive prehension Bench         Dumbbell weights 2 lb   15 y    Cuff weights         Juxacisor         [place] Board         [place] Assortment         Box/Blocks         Pop beads         Hammer Exercises

## 2020-10-08 NOTE — OP OT TREATMENT LOG
Total Time for Session 8-22 Minutes   Modalities Comment Time Done Today Group   Paraffin       Fluidotherapy       Ultrasound       Hot Pack  15 y                   Total Time for Session 8-22 Minutes   Massage Comment Time Done Today   Retrograde      Scar      Soft Tissue Massage  15 y          Total Time for Session 8-22 Minutes   Therapeutic Exercise Comment Time Done Today   AROM      PROM      AAROM  15 y   TGE      Blocking Exercise                   Total Time for Session 8-22 Minutes   Therapeutic Activity Comment Sets Reps Time Done Today Group   BTE         Peg Feeding - DLM         Velcro checkers         Clothespin/ruler    15     Gripper/ 1” blocks         Corn discrimination         Rice discrimination         [place] Transfer         O'Santos tweezer         O'Santos pegboard         Grooved pegboard         Wrist Roll    15     Theraputty         Superior box         Pipe Assembly - PVC         Pipe assembly - metal         Connect a link         MRMT         Puzzles         Playing cards         Blue water pump         Washer/ dowel         Pegboard Activities         Toothpicks         Serial Knob Table - Resistive prehension Bench         Dumbbell weights 2 lb   15 y    Cuff weights         Juxacisor         [place] Board         [place] Assortment         Box/Blocks         Pop beads         Hammer Exercises

## 2020-10-12 ENCOUNTER — HOSPITAL ENCOUNTER (OUTPATIENT)
Dept: OCCUPATIONAL THERAPY | Facility: HOSPITAL | Age: 55
Setting detail: THERAPIES SERIES
Discharge: HOME | End: 2020-10-12
Attending: ORTHOPAEDIC SURGERY
Payer: COMMERCIAL

## 2020-10-12 DIAGNOSIS — M65.822 OTHER SYNOVITIS AND TENOSYNOVITIS, LEFT UPPER ARM: Primary | ICD-10-CM

## 2020-10-12 PROCEDURE — 97110 THERAPEUTIC EXERCISES: CPT | Mod: GO

## 2020-10-12 PROCEDURE — 97010 HOT OR COLD PACKS THERAPY: CPT | Mod: GO

## 2020-10-12 PROCEDURE — 97124 MASSAGE THERAPY: CPT | Mod: GO

## 2020-10-12 NOTE — OP OT TREATMENT LOG
Total Time for Session 8-22 Minutes   Modalities Comment Time Done Today Group   Paraffin       Fluidotherapy       Ultrasound       Hot Pack  15 y                   Total Time for Session 8-22 Minutes   Massage Comment Time Done Today   Retrograde      Scar      Soft Tissue Massage  15 y          Total Time for Session 8-22 Minutes   Therapeutic Exercise Comment Time Done Today   AROM      PROM      AAROM  15 y   TGE      Blocking Exercise                   Total Time for Session 8-22 Minutes   Therapeutic Activity Comment Sets Reps Time Done Today Group   BTE         Peg Feeding - DLM         Velcro checkers         Clothespin/ruler    15     Gripper/ 1” blocks         Corn discrimination         Rice discrimination         [place] Transfer         O'Santos tweezer         O'Santos pegboard         Grooved pegboard         Wrist Roll    15     Theraputty         [place] box         Pipe Assembly - PVC         Pipe assembly - metal         Connect a link         MRMT         Puzzles         Playing cards         Blue water pump         Washer/ dowel         Pegboard Activities         Toothpicks         Serial Knob Table - Resistive prehension Bench         Dumbbell weights 2 lb   15 y    Cuff weights         Juxacisor         [place] Board         [place] Assortment         Box/Blocks         Pop beads         Hammer Exercises

## 2020-10-12 NOTE — PROGRESS NOTES
OT DAILY NOTE FOR OUTPATIENT THERAPY    Patient: Debbie Kowalski   MRN: [id number]  : 1965 54 y.o.  Referring Physician: Jatin Salazar, *  Date of Visit: 10/12/2020      Certification Dates: 20 through 20    Diagnosis:   1. Other synovitis and tenosynovitis, left upper arm        Chief Complaints:   Decrease ROM and strength    Precautions:  Existing Precautions/Restrictions: no known precautions/restrictions    TODAY'S VISIT    History/Vitals/Pain/Encounter Info - 10/12/20 1333        Injury History/Precautions/Daily Required Info    Document Type  daily treatment     Primary Therapist  OT     Chief Complaint/Reason for Visit   Decrease ROM and strength     Onset of Illness/Injury or Date of Surgery  19     Referring Physician  Dr. Salazar     Existing Precautions/Restrictions  no known precautions/restrictions     Patient/Family/Caregiver Comments/Observations  I am still having discomfort at my little finger side of my hand     Start Time  1100     Stop Time  1200     Time Calculation (min)  60 min     Patient reported fall since last visit  No        Pain Assessment    Currently in pain  No/Denies        Pain Interventions    Intervention   heat     Post Intervention Comments  deny         Daily Treatment Assessment and Plan - 10/12/20 1335        Daily Treatment Assessment and Plan    Progress toward goals  Progressing     Daily Outcome Summary  Patient noting pain and edema on ulnar side of left hand.  Tubigrip C issued for pressure.  Patient to contact MD about an MRI     Plan and Recommendations  Continue program               Today's Treatment:             Total Time for Session 8-22 Minutes   Modalities Comment Time Done Today Group   Paraffin       Fluidotherapy       Ultrasound       Hot Pack  15 y                   Total Time for Session 8-22 Minutes   Massage Comment Time Done Today   Retrograde      Scar      Soft Tissue Massage  15 y          Total Time for  Session 8-22 Minutes   Therapeutic Exercise Comment Time Done Today   AROM      PROM      AAROM  15 y   TGE      Blocking Exercise                   Total Time for Session 8-22 Minutes   Therapeutic Activity Comment Sets Reps Time Done Today Group   BTE         Peg Feeding - DLM         Velcro checkers         Clothespin/ruler    15     Gripper/ 1” blocks         Corn discrimination         Rice discrimination         [place] Transfer         O'Santos tweezer         O'Santos pegboard         Grooved pegboard         Wrist Roll    15     Theraputty         [place] box         Pipe Assembly - PVC         Pipe assembly - metal         Connect a link         MRMT         Puzzles         Playing cards         Blue water pump         Washer/ dowel         Pegboard Activities         Toothpicks         Serial Knob Table - Resistive prehension Bench         Dumbbell weights 2 lb   15 y    Cuff weights         Juxacisor         [place] Board         [place] Assortment         Box/Blocks         Pop beads         Hammer Exercises

## 2020-10-14 RX ORDER — TRAZODONE HYDROCHLORIDE 100 MG/1
TABLET ORAL
Qty: 180 TABLET | Refills: 0 | Status: SHIPPED | OUTPATIENT
Start: 2020-10-14 | End: 2021-01-06

## 2020-10-14 NOTE — TELEPHONE ENCOUNTER
Medicine Refill Request    Last Office Visit: Visit date not found  Last Telemedicine Visit: 5/7/2020 Liang Gibbs DO    Next Office Visit: 10/21/2020  Next Telemedicine Visit: Visit date not found         Current Outpatient Medications:   •  ammonium lactate (AMLACTIN) 12 % cream, APPLY TO FOOT DAILY, Disp: , Rfl:   •  ammonium lactate (AMLACTIN) 12 % cream, Apply topically as needed for dry skin., Disp: 385 g, Rfl: 0  •  baclofen (LIORESAL) 10 mg tablet, Take 1 tablet (10 mg total) by mouth 2 (two) times a day. (Patient not taking: Reported on 6/10/2020 ), Disp: 180 tablet, Rfl: 1  •  carisoprodol (SOMA) 350 mg tablet, Take 350 mg by mouth as needed for muscle spasms., Disp: , Rfl:   •  cloNIDine (CATAPRES) 0.1 mg tablet, Take 1 tablet (0.1 mg total) by mouth daily., Disp: 90 tablet, Rfl: 0  •  LORazepam (ATIVAN) 0.5 mg tablet, Take 1 tablet (0.5 mg total) by mouth 3 (three) times a day., Disp: 90 tablet, Rfl: 0  •  melatonin 5 mg tablet, Take 5 mg by mouth nightly.  , Disp: , Rfl:   •  multivitamin (THERAGRAN) tablet, take 1 tablet by oral route  every day, Disp: , Rfl:   •  NOT IN DATABASE, Patient on a antibiotic bid , not sure of the name, Disp: , Rfl:   •  omeprazole (PriLOSEC) 40 mg capsule, Take 1 capsule (40 mg total) by mouth daily before breakfast., Disp: 90 capsule, Rfl: 1  •  ondansetron ODT (ZOFRAN-ODT) 4 mg disintegrating tablet, Take 1 tablet (4 mg total) by mouth every 8 (eight) hours as needed for nausea or vomiting. (Patient not taking: Reported on 6/10/2020 ), Disp: 12 tablet, Rfl: 0  •  oxyCODONE-acetaminophen (PERCOCET) 5-325 mg per tablet, Take 1 tablet by mouth every 6 (six) hours as needed for moderate pain. Initial treatment.  NO driving or operating heavy machinery, Disp: 5 tablet, Rfl: 0  •  PARoxetine (PAXIL) 10 mg tablet, Take 1 tablet (10 mg total) by mouth daily., Disp: 90 tablet, Rfl: 3  •  traZODone (DESYREL) 100 mg tablet, Take two tablets at night., Disp: 180 tablet, Rfl:  0      BP Readings from Last 3 Encounters:   06/10/20 120/78   11/04/19 (!) 141/74   11/04/19 124/75       Recent Lab results:  No results found for: CHOL, No results found for: HDL, No results found for: LDLCALC, No results found for: TRIG     Lab Results   Component Value Date    GLUCOSE 105 (H) 11/04/2019   , No results found for: HGBA1C      Lab Results   Component Value Date    CREATININE 0.7 11/04/2019       No results found for: TSH

## 2020-10-21 ENCOUNTER — TELEMEDICINE (OUTPATIENT)
Dept: FAMILY MEDICINE | Facility: CLINIC | Age: 55
End: 2020-10-21
Payer: COMMERCIAL

## 2020-10-21 DIAGNOSIS — D64.9 ANEMIA, UNSPECIFIED TYPE: ICD-10-CM

## 2020-10-21 DIAGNOSIS — F41.9 ANXIETY: ICD-10-CM

## 2020-10-21 DIAGNOSIS — F32.A DEPRESSION, UNSPECIFIED DEPRESSION TYPE: ICD-10-CM

## 2020-10-21 DIAGNOSIS — E55.9 VITAMIN D DEFICIENCY: ICD-10-CM

## 2020-10-21 DIAGNOSIS — M25.532 LEFT WRIST PAIN: Primary | ICD-10-CM

## 2020-10-21 DIAGNOSIS — E83.42 HYPOMAGNESEMIA: ICD-10-CM

## 2020-10-21 DIAGNOSIS — Z13.220 LIPID SCREENING: ICD-10-CM

## 2020-10-21 PROCEDURE — 99214 OFFICE O/P EST MOD 30 MIN: CPT | Mod: 95 | Performed by: INTERNAL MEDICINE

## 2020-10-21 RX ORDER — LORAZEPAM 0.5 MG/1
0.5 TABLET ORAL 3 TIMES DAILY
Qty: 90 TABLET | Refills: 3 | Status: SHIPPED | OUTPATIENT
Start: 2020-10-21 | End: 2021-02-12 | Stop reason: SDUPTHER

## 2020-10-21 RX ORDER — CLOBETASOL PROPIONATE 0.5 MG/ML
SOLUTION TOPICAL 2 TIMES DAILY
Qty: 50 ML | Refills: 3 | Status: SHIPPED | OUTPATIENT
Start: 2020-10-21 | End: 2022-01-05 | Stop reason: SDUPTHER
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
## 2020-10-24 ENCOUNTER — TELEPHONE (OUTPATIENT)
Dept: FAMILY MEDICINE | Facility: CLINIC | Age: 55
End: 2020-10-24

## 2020-10-26 ENCOUNTER — HOSPITAL ENCOUNTER (OUTPATIENT)
Dept: OCCUPATIONAL THERAPY | Facility: HOSPITAL | Age: 55
Setting detail: THERAPIES SERIES
Discharge: HOME | End: 2020-10-26
Attending: ORTHOPAEDIC SURGERY
Payer: COMMERCIAL

## 2020-10-26 DIAGNOSIS — M65.822 OTHER SYNOVITIS AND TENOSYNOVITIS, LEFT UPPER ARM: Primary | ICD-10-CM

## 2020-10-26 DIAGNOSIS — M65.832 OTHER SYNOVITIS AND TENOSYNOVITIS, LEFT FOREARM: ICD-10-CM

## 2020-10-26 PROCEDURE — 97110 THERAPEUTIC EXERCISES: CPT | Mod: GO

## 2020-10-26 PROCEDURE — 97530 THERAPEUTIC ACTIVITIES: CPT | Mod: GO

## 2020-10-26 PROCEDURE — 97124 MASSAGE THERAPY: CPT | Mod: GO

## 2020-10-26 PROCEDURE — 97010 HOT OR COLD PACKS THERAPY: CPT | Mod: GO

## 2020-10-26 NOTE — PROGRESS NOTES
OT DAILY NOTE FOR OUTPATIENT THERAPY    Patient: Debbie Kowalski   MRN: [id number]  : 1965 54 y.o.  Referring Physician: Jatin Salazar, *  Date of Visit: 10/26/2020      Certification Dates: 20 through 20    Diagnosis:   1. Other synovitis and tenosynovitis, left upper arm    2. Other synovitis and tenosynovitis, left forearm        Chief Complaints:   Decrease ROM and strength    Precautions:  Existing Precautions/Restrictions: no known precautions/restrictions    TODAY'S VISIT    History/Vitals/Pain/Encounter Info - 10/26/20 1503        Injury History/Precautions/Daily Required Info    Document Type  daily treatment     Primary Therapist  OT     Chief Complaint/Reason for Visit   Decrease ROM and strength     Onset of Illness/Injury or Date of Surgery  19     Referring Physician  Dr. Salazar     Existing Precautions/Restrictions  no known precautions/restrictions     Patient/Family/Caregiver Comments/Observations  I called the doctor about my hand and he didn't want to see me so I am going to my family doctor.     Start Time  1300     Stop Time  1400     Time Calculation (min)  60 min     Patient reported fall since last visit  No        Pain Assessment    Currently in pain  No/Denies        Pain Interventions    Intervention   heat     Post Intervention Comments  deny         Daily Treatment Assessment and Plan - 10/26/20 1508        Daily Treatment Assessment and Plan    Progress toward goals  Progressing     Daily Outcome Summary  Patient noting pain and edema on ulnar side of left hand.  Tubigrip C issued for pressure.     Plan and Recommendations  Continue program           Today's Treatment:             Total Time for Session 8-22 Minutes   Modalities Comment Time Done Today Group   Paraffin       Fluidotherapy       Ultrasound       Hot Pack  15 y                   Total Time for Session 8-22 Minutes   Massage Comment Time Done Today   Retrograde      Scar      Soft  Tissue Massage  15 y          Total Time for Session 8-22 Minutes   Therapeutic Exercise Comment Time Done Today   AROM      PROM      AAROM  15 y   TGE      Blocking Exercise                   Total Time for Session 8-22 Minutes   Therapeutic Activity Comment Sets Reps Time Done Today Group   BTE         Peg Feeding - DLM         Velcro checkers         Clothespin/ruler         Gripper/ 1” blocks         Corn discrimination         Rice discrimination         [place] Transfer         O'Santos tweezer    15 y    O'Santos pegboard         Grooved pegboard         Wrist Roll         Theraputty         [place] box         Pipe Assembly - PVC         Pipe assembly - metal         Connect a link         MRMT         Puzzles         Playing cards         Blue water pump         Washer/ dowel         Pegboard Activities         Toothpicks         Serial Knob Table - Resistive prehension Bench         Dumbbell weights         Cuff weights         Juxacisor         [place] Board         [place] Assortment         Box/Blocks         Pop beads         Hammer Exercises                            Total Time for Session 8-22 Minutes   Orthotics Comment Done Today     Volar wrist     Ulnar gutter     Thumb Spica     Finger-based     Mallet     Prefabricated     Hand based      Elbow      Tubigrip C for left hand pressure 15 y   Patient instruction/comments

## 2020-10-27 ENCOUNTER — HOSPITAL ENCOUNTER (OUTPATIENT)
Dept: RADIOLOGY | Age: 55
Discharge: HOME | End: 2020-10-27
Attending: INTERNAL MEDICINE
Payer: COMMERCIAL

## 2020-10-27 ENCOUNTER — APPOINTMENT (OUTPATIENT)
Dept: LAB | Age: 55
End: 2020-10-27
Attending: INTERNAL MEDICINE
Payer: COMMERCIAL

## 2020-10-27 DIAGNOSIS — M25.532 LEFT WRIST PAIN: ICD-10-CM

## 2020-10-27 DIAGNOSIS — E83.42 HYPOMAGNESEMIA: ICD-10-CM

## 2020-10-27 DIAGNOSIS — D64.9 ANEMIA, UNSPECIFIED TYPE: ICD-10-CM

## 2020-10-27 DIAGNOSIS — E55.9 VITAMIN D DEFICIENCY: ICD-10-CM

## 2020-10-27 DIAGNOSIS — Z13.220 LIPID SCREENING: ICD-10-CM

## 2020-10-27 PROCEDURE — 36415 COLL VENOUS BLD VENIPUNCTURE: CPT

## 2020-10-27 PROCEDURE — 73110 X-RAY EXAM OF WRIST: CPT | Mod: LT

## 2020-10-29 RX ORDER — HYDROCODONE BITARTRATE AND ACETAMINOPHEN 5; 325 MG/1; MG/1
1 TABLET ORAL EVERY 6 HOURS PRN
Qty: 45 TABLET | Refills: 0 | Status: SHIPPED | OUTPATIENT
Start: 2020-10-29 | End: 2020-11-08

## 2020-10-30 LAB
25(OH)D3 SERPL-MCNC: NORMAL NG/ML
ABO + RH BLD: NORMAL
ALBUMIN SERPL-MCNC: NORMAL G/DL
ALP SERPL-CCNC: NORMAL U/L
ALT SERPL-CCNC: NORMAL U/L
ANION GAP SERPL CALC-SCNC: NORMAL MMOL/L
AST SERPL-CCNC: NORMAL U/L
BILIRUB SERPL-MCNC: NORMAL MG/DL
BUN SERPL-MCNC: NORMAL MG/DL
CALCIUM SERPL-MCNC: NORMAL MG/DL
CHLORIDE SERPL-SCNC: NORMAL MMOL/L
CHOLEST SERPL-MCNC: NORMAL MG/DL
CO2 SERPL-SCNC: NORMAL MMOL/L
CREAT SERPL-MCNC: NORMAL MG/DL
D AG BLD QL: NORMAL
ERYTHROCYTE [DISTWIDTH] IN BLOOD BY AUTOMATED COUNT: NORMAL %
GFR SERPL CREATININE-BSD FRML MDRD: NORMAL ML/MIN/{1.73_M2}
GLUCOSE SERPL-MCNC: NORMAL MG/DL
HCT VFR BLDCO AUTO: NORMAL %
HDLC SERPL-MCNC: NORMAL MG/DL
HDLC SERPL: NORMAL {RATIO}
HGB BLD-MCNC: NORMAL G/DL
LDLC SERPL CALC-MCNC: NORMAL MG/DL
Lab: NORMAL
MAGNESIUM SERPL-MCNC: NORMAL MG/DL
MCH RBC QN AUTO: NORMAL PG
MCHC RBC AUTO-ENTMCNC: NORMAL G/DL
MCV RBC AUTO: NORMAL FL
NONHDLC SERPL-MCNC: NORMAL MG/DL
PDW BLD AUTO: NORMAL FL
PLATELET # BLD AUTO: NORMAL 10*3/UL
POTASSIUM SERPL-SCNC: NORMAL MMOL/L
PROT SERPL-MCNC: NORMAL G/DL
RBC # BLD AUTO: NORMAL 10*6/UL
SODIUM SERPL-SCNC: NORMAL MMOL/L
TRIGL SERPL-MCNC: NORMAL MG/DL
WBC # BLD AUTO: NORMAL 10*3/UL

## 2020-11-17 ENCOUNTER — HOSPITAL ENCOUNTER (OUTPATIENT)
Dept: OCCUPATIONAL THERAPY | Facility: HOSPITAL | Age: 55
Setting detail: THERAPIES SERIES
End: 2020-11-17
Attending: ORTHOPAEDIC SURGERY
Payer: COMMERCIAL

## 2020-12-02 ENCOUNTER — TELEPHONE (OUTPATIENT)
Dept: FAMILY MEDICINE | Facility: CLINIC | Age: 55
End: 2020-12-02

## 2020-12-02 NOTE — TELEPHONE ENCOUNTER
Spoke with Enrique at [phone]    No auth needed for MRI left wrist    Ref:  [id number]    Left message for patient with information

## 2020-12-29 ENCOUNTER — DOCUMENTATION (OUTPATIENT)
Dept: OCCUPATIONAL THERAPY | Facility: HOSPITAL | Age: 55
End: 2020-12-29

## 2020-12-29 NOTE — PROGRESS NOTES
OT DISCHARGE NOTE FOR OUTPATIENT THERAPY    Patient: Debbie Kowalski   MRN: [id number]  : 1965 55 y.o.  Referring Physician: No ref. provider found  Date of Visit: 2020      Certification Dates:  20  through 10/26/20    Total Visit Count: 12    Chief Complaints:   Chief Complaint   Patient presents with   • Did not return                         Goals Addressed                 This Visit's Progress    • COMPLETED: OT STGs and LTGs        All goals to be addressed upon return for follow-up in 6 weeks.    STG: Pt will demonstrate independence applying splint on  L   hand in 1 week-goal met first day.  STG: Pt will understanding importance of adhering to splint precautions and wearing schedule in 1 week  LTG: Pt will safely and independently  perform ADLs  keeping splint intact at all times    LTG: Pt will perform ADLs with  2/10 pain in  L   hand while splint is intact     Patient goal: Return to playing the piano.

## 2021-01-28 ENCOUNTER — TRANSCRIBE ORDERS (OUTPATIENT)
Dept: SCHEDULING | Facility: REHABILITATION | Age: 56
End: 2021-01-28

## 2021-02-04 ENCOUNTER — TRANSCRIBE ORDERS (OUTPATIENT)
Dept: SCHEDULING | Facility: REHABILITATION | Age: 56
End: 2021-02-04

## 2021-02-04 DIAGNOSIS — S63.512D SPRAIN OF CARPAL JOINT OF LEFT WRIST, SUBSEQUENT ENCOUNTER: Primary | ICD-10-CM

## 2021-02-10 ENCOUNTER — HOSPITAL ENCOUNTER (OUTPATIENT)
Dept: OCCUPATIONAL THERAPY | Facility: HOSPITAL | Age: 56
Setting detail: THERAPIES SERIES
Discharge: HOME | End: 2021-02-10
Attending: ORTHOPAEDIC SURGERY
Payer: COMMERCIAL

## 2021-02-10 DIAGNOSIS — S63.512D SPRAIN OF CARPAL JOINT OF LEFT WRIST, SUBSEQUENT ENCOUNTER: Primary | ICD-10-CM

## 2021-02-10 PROBLEM — S63.512A SPRAIN OF CARPAL JOINT OF LEFT WRIST: Status: ACTIVE | Noted: 2021-02-10

## 2021-02-10 PROCEDURE — 97530 THERAPEUTIC ACTIVITIES: CPT | Mod: GO

## 2021-02-10 PROCEDURE — 97110 THERAPEUTIC EXERCISES: CPT | Mod: GO

## 2021-02-10 PROCEDURE — 97124 MASSAGE THERAPY: CPT | Mod: GO

## 2021-02-10 PROCEDURE — 97010 HOT OR COLD PACKS THERAPY: CPT | Mod: GO

## 2021-02-10 PROCEDURE — 97165 OT EVAL LOW COMPLEX 30 MIN: CPT | Mod: GO
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
## 2021-02-10 NOTE — OP OT TREATMENT LOG
Total Time for Session 8-22 Minutes   Modalities Comment Time Done Today Group   Paraffin       Fluidotherapy       Ultrasound       Hot Pack  15 y                   Total Time for Session 8-22 Minutes   Massage Comment Time Done Today   Retrograde      Scar  15 y   Soft Tissue Massage             Total Time for Session 8-22 Minutes   Therapeutic Exercise Comment Time Done Today   AROM      PROM      AAROM  15 y   TGE      Blocking Exercise                   Total Time for Session 8-22 Minutes   Therapeutic Activity Comment Sets Reps Time Done Today Group   BTE         Peg Feeding - DLM         Velcro checkers         Clothespin/ruler         Gripper/ 1” blocks         Corn discrimination         Rice discrimination         [place] Transfer         O'Santos tweezer         O'Santos pegboard         Grooved pegboard         Wrist Roll         Theraband  red    15 y    [place] box         Pipe Assembly - PVC         Pipe assembly - metal         Connect a link         MRMT         Puzzles         Playing cards         Blue water pump         Washer/ dowel         Pegboard Activities         Toothpicks         Serial Knob Table - Resistive prehension Bench         Dumbbell weights         Cuff weights         Juxacisor         [place] Board         [place] Assortment         Box/Blocks         Pop beads         Hammer Exercises                            Total Time for Session Not performed   Orthotics Comment Done Today     Volar wrist     Ulnar gutter     Thumb Spica     Finger-based     Mallet     Prefabricated     Hand based      Elbow          Patient instruction/comments
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
## 2021-02-12 ENCOUNTER — TELEPHONE (OUTPATIENT)
Dept: FAMILY MEDICINE | Facility: CLINIC | Age: 56
End: 2021-02-12

## 2021-02-12 DIAGNOSIS — F41.9 ANXIETY: ICD-10-CM

## 2021-02-12 DIAGNOSIS — F32.A DEPRESSION, UNSPECIFIED DEPRESSION TYPE: ICD-10-CM

## 2021-02-12 RX ORDER — LORAZEPAM 0.5 MG/1
0.5 TABLET ORAL 3 TIMES DAILY
Qty: 90 TABLET | Refills: 3 | Status: SHIPPED | OUTPATIENT
Start: 2021-02-12 | End: 2021-04-19 | Stop reason: SDUPTHER

## 2021-02-12 NOTE — TELEPHONE ENCOUNTER
Pt is going out of town on Sunday which Pt would like to know if rx Lorazepam 0.5 mg can be filled earlier. Pt can be reached at . Pt states Pharmacy [facility]  said it can be filled earlier if Dr. Martinez

## 2021-02-25 ENCOUNTER — HOSPITAL ENCOUNTER (OUTPATIENT)
Dept: OCCUPATIONAL THERAPY | Facility: HOSPITAL | Age: 56
Setting detail: THERAPIES SERIES
Discharge: HOME | End: 2021-02-25
Attending: ORTHOPAEDIC SURGERY
Payer: COMMERCIAL

## 2021-02-25 DIAGNOSIS — S63.512D SPRAIN OF CARPAL JOINT OF LEFT WRIST, SUBSEQUENT ENCOUNTER: Primary | ICD-10-CM

## 2021-02-25 PROCEDURE — 97530 THERAPEUTIC ACTIVITIES: CPT | Mod: GO

## 2021-02-25 PROCEDURE — 97110 THERAPEUTIC EXERCISES: CPT | Mod: GO

## 2021-02-25 PROCEDURE — 97010 HOT OR COLD PACKS THERAPY: CPT | Mod: GO

## 2021-02-25 NOTE — PROGRESS NOTES
OT DAILY NOTE FOR OUTPATIENT THERAPY    Patient: Debbie Kowalski   MRN: [id number]  : 1965 55 y.o.  Referring Physician: Jatin Salazar, *  Date of Visit: 2021      Certification Dates: 02/10/21 through 21    Diagnosis:   1. Sprain of carpal joint of left wrist, subsequent encounter        Chief Complaints:   Decrease ROM and strength    Precautions:  Existing Precautions/Restrictions: no known precautions/restrictions    TODAY'S VISIT    History/Vitals/Pain/Encounter Info - 21 1253        Injury History/Precautions/Daily Required Info    Document Type  daily treatment     Primary Therapist  OT     Chief Complaint/Reason for Visit   Decrease ROM and strength     Onset of Illness/Injury or Date of Surgery  19     Referring Physician  Dr. Salazar     Existing Precautions/Restrictions  no known precautions/restrictions     Patient/Family/Caregiver Comments/Observations  I was in [facility] and didn't take my exercise bands.     Start Time  1130     Stop Time  1215     Time Calculation (min)  45 min     Patient reported fall since last visit  No        Pain Assessment    Currently in pain  No/Denies        Pain Interventions    Intervention   heat     Post Intervention Comments  deny         Daily Treatment Assessment and Plan - 21 1257        Daily Treatment Assessment and Plan    Progress toward goals  Progressing     Daily Outcome Summary  Patient returns from vacation w/o completing exercises     Plan and Recommendations  Continue program             Today's Treatment:             Total Time for Session 8-22 Minutes   Modalities Comment Time Done Today Group   Paraffin       Fluidotherapy       Ultrasound       Hot Pack  15 y                   Total Time for Session 8-22 Minutes   Therapeutic Exercise Comment Time Done Today   AROM  15 y   PROM      AAROM      TGE      Blocking Exercise                   Total Time for Session 8-22 Minutes   Therapeutic Activity  Comment Sets Reps Time Done Today Group   BTE         Peg Feeding - DLM         Velcro checkers         Clothespin/ruler         Gripper/ 1” blocks         Corn discrimination         Rice discrimination         [place] Transfer         O'Santos JADE'Santos pegboard         Grooved pegboard         Wrist Roll         Theraputty         [place] box         Pipe Assembly - PVC         Pipe assembly - metal         Connect a link         MRMT         Puzzles         Playing cards         Blue water pump         Washer/ dowel         Pegboard Activities         Toothpicks         Serial Knob Table - Resistive prehension Bench         Dumbbell weights 4,3    y    Cuff weights         Juxacisor         [place] Board         [place] Assortment         Box/Blocks         Pop beads         Hammer Exercises     y

## 2021-02-25 NOTE — OP OT TREATMENT LOG
Total Time for Session 8-22 Minutes   Modalities Comment Time Done Today Group   Paraffin       Fluidotherapy       Ultrasound       Hot Pack  15 y                   Total Time for Session 8-22 Minutes   Therapeutic Exercise Comment Time Done Today   AROM  15 y   PROM      AAROM      TGE      Blocking Exercise                   Total Time for Session 8-22 Minutes   Therapeutic Activity Comment Sets Reps Time Done Today Group   BTE         Peg Feeding - DLM         Velcro checkers         Clothespin/ruler         Gripper/ 1” blocks         Corn discrimination         Rice discrimination         [place] Transfer         O'Santos tweezer         O'Santos pegboard         Grooved pegboard         Wrist Roll         Theraputty         [place] box         Pipe Assembly - PVC         Pipe assembly - metal         Connect a link         MRMT         Puzzles         Playing cards         Blue water pump         Washer/ dowel         Pegboard Activities         Toothpicks         Serial Knob Table - Resistive prehension Bench         Dumbbell weights 4,3    y    Cuff weights         Juxacisor         [place] Board         [place] Assortment         Box/Blocks         Pop beads         Hammer Exercises     y

## 2021-03-26 RX ORDER — PAROXETINE 10 MG/1
10 TABLET, FILM COATED ORAL DAILY
Qty: 90 TABLET | Refills: 1 | Status: SHIPPED | OUTPATIENT
Start: 2021-03-26 | End: 2021-09-03

## 2021-03-26 NOTE — TELEPHONE ENCOUNTER
Medicine Refill Request    Last Office Visit: Visit date not found  Last Telemedicine Visit: 10/21/2020 Liang Gibbs, DO    Next Office Visit: Visit date not found  Next Telemedicine Visit: Visit date not found         Current Outpatient Medications:   •  ammonium lactate (AMLACTIN) 12 % cream, Apply topically as needed for dry skin., Disp: 385 g, Rfl: 0  •  baclofen (LIORESAL) 10 mg tablet, Take 1 tablet (10 mg total) by mouth 2 (two) times a day. (Patient not taking: Reported on 6/10/2020 ), Disp: 180 tablet, Rfl: 1  •  clobetasoL (TEMOVATE) 0.05 % external solution, Apply topically 2 (two) times a day., Disp: 50 mL, Rfl: 3  •  cloNIDine (CATAPRES) 0.1 mg tablet, Take 1 tablet (0.1 mg total) by mouth once daily., Disp: 90 tablet, Rfl: 1  •  LORazepam (ATIVAN) 0.5 mg tablet, Take 1 tablet (0.5 mg total) by mouth 3 (three) times a day., Disp: 90 tablet, Rfl: 3  •  melatonin 5 mg tablet, Take 5 mg by mouth nightly.  , Disp: , Rfl:   •  multivitamin (THERAGRAN) tablet, take 1 tablet by oral route  every day, Disp: , Rfl:   •  omeprazole (PriLOSEC) 40 mg capsule, Take 1 capsule (40 mg total) by mouth daily before breakfast., Disp: 90 capsule, Rfl: 1  •  PARoxetine (PAXIL) 10 mg tablet, Take 1 tablet (10 mg total) by mouth daily., Disp: 90 tablet, Rfl: 3  •  traZODone (DESYREL) 100 mg tablet, TAKE 2 TABLETS AT NIGHT, Disp: 180 tablet, Rfl: 1      BP Readings from Last 3 Encounters:   06/10/20 120/78   11/04/19 (!) 141/74   11/04/19 124/75       Recent Lab results:  Lab Results   Component Value Date    CHOL  10/27/2020      Comment:      See Scanned Result.     ,   Lab Results   Component Value Date    HDL  10/27/2020      Comment:      See Scanned Result.     ,   Lab Results   Component Value Date    LDLCALC  10/27/2020      Comment:      See Scanned Result.     ,   Lab Results   Component Value Date    TRIG  10/27/2020      Comment:      See Scanned Result.            Lab Results   Component Value Date    GLUCOSE   10/27/2020      Comment:      See Scanned Result.       , No results found for: HGBA1C      Lab Results   Component Value Date    CREATININE  10/27/2020      Comment:      See Scanned Result.         No results found for: TSH

## 2021-04-13 NOTE — TELEPHONE ENCOUNTER
Patient contacted office to request prescription refill:    Name of medication:hydrocodone acetaminophen  Strength of medication:5-325  SIG: every 6 hrs  Quantity:45  Requested number refills:0  Preferred Pharmacy: [facility]  Pharmacy number:[phone]

## 2021-04-13 NOTE — TELEPHONE ENCOUNTER
Medicine Refill Request    Last Office Visit: Visit date not found  Last Telemedicine Visit: 10/21/2020 Liang Gibbs DO    Next Office Visit: 6/14/2021  Next Telemedicine Visit: Visit date not found         Current Outpatient Medications:   •  ammonium lactate (AMLACTIN) 12 % cream, Apply topically as needed for dry skin., Disp: 385 g, Rfl: 0  •  baclofen (LIORESAL) 10 mg tablet, Take 1 tablet (10 mg total) by mouth 2 (two) times a day. (Patient not taking: Reported on 6/10/2020 ), Disp: 180 tablet, Rfl: 1  •  clobetasoL (TEMOVATE) 0.05 % external solution, Apply topically 2 (two) times a day., Disp: 50 mL, Rfl: 3  •  cloNIDine (CATAPRES) 0.1 mg tablet, Take 1 tablet (0.1 mg total) by mouth once daily., Disp: 90 tablet, Rfl: 1  •  LORazepam (ATIVAN) 0.5 mg tablet, Take 1 tablet (0.5 mg total) by mouth 3 (three) times a day., Disp: 90 tablet, Rfl: 3  •  melatonin 5 mg tablet, Take 5 mg by mouth nightly.  , Disp: , Rfl:   •  multivitamin (THERAGRAN) tablet, take 1 tablet by oral route  every day, Disp: , Rfl:   •  omeprazole (PriLOSEC) 40 mg capsule, Take 1 capsule (40 mg total) by mouth daily before breakfast., Disp: 90 capsule, Rfl: 1  •  PARoxetine (PAXIL) 10 mg tablet, Take 1 tablet (10 mg total) by mouth daily., Disp: 90 tablet, Rfl: 1  •  traZODone (DESYREL) 100 mg tablet, TAKE 2 TABLETS AT NIGHT, Disp: 180 tablet, Rfl: 1      BP Readings from Last 3 Encounters:   06/10/20 120/78   11/04/19 (!) 141/74   11/04/19 124/75       Recent Lab results:  Lab Results   Component Value Date    CHOL  10/27/2020      Comment:      See Scanned Result.     ,   Lab Results   Component Value Date    HDL  10/27/2020      Comment:      See Scanned Result.     ,   Lab Results   Component Value Date    LDLCALC  10/27/2020      Comment:      See Scanned Result.     ,   Lab Results   Component Value Date    TRIG  10/27/2020      Comment:      See Scanned Result.            Lab Results   Component Value Date    GLUCOSE  10/27/2020       Comment:      See Scanned Result.       , No results found for: HGBA1C      Lab Results   Component Value Date    CREATININE  10/27/2020      Comment:      See Scanned Result.         No results found for: TSH

## 2021-04-14 RX ORDER — HYDROCODONE BITARTRATE AND ACETAMINOPHEN 5; 325 MG/1; MG/1
1 TABLET ORAL EVERY 6 HOURS PRN
Qty: 45 TABLET | Refills: 0 | Status: SHIPPED | OUTPATIENT
Start: 2021-04-14 | End: 2021-04-24

## 2021-04-19 DIAGNOSIS — F32.A DEPRESSION, UNSPECIFIED DEPRESSION TYPE: ICD-10-CM

## 2021-04-19 DIAGNOSIS — F41.9 ANXIETY: ICD-10-CM

## 2021-04-19 RX ORDER — LORAZEPAM 0.5 MG/1
0.5 TABLET ORAL 3 TIMES DAILY
Qty: 90 TABLET | Refills: 3 | Status: SHIPPED | OUTPATIENT
Start: 2021-04-19 | End: 2021-04-21 | Stop reason: SDUPTHER

## 2021-04-19 NOTE — TELEPHONE ENCOUNTER
Last refill was transferred to [place] needs a [facility]. Leaving for another vacation this afternoon.

## 2021-04-21 DIAGNOSIS — F41.9 ANXIETY: ICD-10-CM

## 2021-04-21 DIAGNOSIS — F32.A DEPRESSION, UNSPECIFIED DEPRESSION TYPE: ICD-10-CM

## 2021-04-21 RX ORDER — LORAZEPAM 0.5 MG/1
0.5 TABLET ORAL 3 TIMES DAILY
Qty: 90 TABLET | Refills: 3 | Status: SHIPPED | OUTPATIENT
Start: 2021-04-21 | End: 2021-08-20

## 2021-04-21 NOTE — TELEPHONE ENCOUNTER
Cancelled order at [facility] [place] as she is traveling again .

## 2021-05-20 ENCOUNTER — TELEPHONE (OUTPATIENT)
Dept: FAMILY MEDICINE | Facility: CLINIC | Age: 56
End: 2021-05-20

## 2021-05-24 ENCOUNTER — CONSULT (OUTPATIENT)
Dept: FAMILY MEDICINE | Facility: CLINIC | Age: 56
End: 2021-05-24
Payer: COMMERCIAL

## 2021-05-24 VITALS
TEMPERATURE: 97.8 F | HEART RATE: 72 BPM | BODY MASS INDEX: 25.9 KG/M2 | SYSTOLIC BLOOD PRESSURE: 124 MMHG | HEIGHT: 67 IN | OXYGEN SATURATION: 98 % | DIASTOLIC BLOOD PRESSURE: 72 MMHG | WEIGHT: 165 LBS

## 2021-05-24 DIAGNOSIS — F41.9 ANXIETY: ICD-10-CM

## 2021-05-24 DIAGNOSIS — M25.531 RIGHT WRIST PAIN: Primary | ICD-10-CM

## 2021-05-24 DIAGNOSIS — F32.A DEPRESSION, UNSPECIFIED DEPRESSION TYPE: ICD-10-CM

## 2021-05-24 DIAGNOSIS — Z01.818 PRE-OP EVALUATION: ICD-10-CM

## 2021-05-24 PROBLEM — D05.12 DUCTAL CARCINOMA IN SITU (DCIS) OF LEFT BREAST: Status: ACTIVE | Noted: 2019-02-23

## 2021-05-24 PROCEDURE — 99213 OFFICE O/P EST LOW 20 MIN: CPT | Mod: 25 | Performed by: INTERNAL MEDICINE

## 2021-05-24 PROCEDURE — 3008F BODY MASS INDEX DOCD: CPT | Performed by: INTERNAL MEDICINE

## 2021-05-24 PROCEDURE — 93000 ELECTROCARDIOGRAM COMPLETE: CPT | Mod: 76 | Performed by: INTERNAL MEDICINE

## 2021-05-24 RX ORDER — MELOXICAM 15 MG/1
15 TABLET ORAL DAILY
COMMUNITY
Start: 2021-05-17

## 2021-05-24 RX ORDER — LORAZEPAM 0.5 MG/1
0.5 TABLET ORAL 3 TIMES DAILY
Qty: 90 TABLET | Refills: 3 | Status: CANCELLED | OUTPATIENT
Start: 2021-05-24 | End: 2021-06-23

## 2021-05-24 ASSESSMENT — PATIENT HEALTH QUESTIONNAIRE - PHQ9: SUM OF ALL RESPONSES TO PHQ9 QUESTIONS 1 & 2: 2

## 2021-05-24 NOTE — PROGRESS NOTES
"      Subjective      Patient ID: Debbie Kowalski is a 55 y.o. female.  1965      56 y/o female presents for pre-operative evaluation   Right wrist surgery 5/28 Dr. Salazar    Reduced activity lately with plantar fasciitis and now in walking boot   No CP, SOB  No history HTN     Multiple surgeries in the past   No adverse reaction to anesthesia  No easy bruising, bleeding         The following have been reviewed and updated as appropriate in this visit:  Problems       Review of Systems   All other systems reviewed and are negative.      Objective     Vitals:    05/24/21 1159   BP: 124/72   BP Location: Right upper arm   Patient Position: Sitting   Pulse: 72   Temp: 36.6 °C (97.8 °F)   SpO2: 98%   Weight: 74.8 kg (165 lb)   Height: 1.702 m (5' 7\")     Body mass index is 25.84 kg/m².    Physical Exam  Constitutional:       Appearance: Normal appearance.   Cardiovascular:      Rate and Rhythm: Normal rate and regular rhythm.      Pulses: Normal pulses.      Heart sounds: Normal heart sounds. No murmur heard.     Pulmonary:      Effort: Pulmonary effort is normal.      Breath sounds: Normal breath sounds. No wheezing or rhonchi.   Neurological:      Mental Status: She is alert.         Assessment/Plan   Diagnoses and all orders for this visit:    Right wrist pain (Primary)  Patient is low risk for surgery   No Anginal symptomns   Blood pressure well controlled   EKG - sinus 60 axis wnl no ST changes, no T wave inversions, intervals wnl   No change from prior   Do not take any NSAIDs (Motrin, Advil, Ibuprofen, Naproxen, Aleve, Aspirin or any prescription) 7 days prior to surgery  Also please do not take if taking fish oil 7 days prior to surgery   Proceed with surgery       Pre-op evaluation  -     ECG 12 LEAD OFFICE PERFORMED    Anxiety  -     LORazepam (ATIVAN) 0.5 mg tablet; Take 1 tablet (0.5 mg total) by mouth 3 (three) times a day.    Depression, unspecified depression type  -     LORazepam (ATIVAN) 0.5 " mg tablet; Take 1 tablet (0.5 mg total) by mouth 3 (three) times a day.

## 2021-05-25 LAB
ALBUMIN SERPL-MCNC: 4.5 G/DL (ref 3.8–4.9)
ALBUMIN/GLOB SERPL: 1.9 {RATIO} (ref 1.2–2.2)
ALP SERPL-CCNC: 76 IU/L (ref 48–121)
ALT SERPL-CCNC: 11 IU/L (ref 0–32)
AST SERPL-CCNC: 14 IU/L (ref 0–40)
BASOPHILS # BLD AUTO: 0 X10E3/UL (ref 0–0.2)
BASOPHILS NFR BLD AUTO: 1 %
BILIRUB SERPL-MCNC: 0.3 MG/DL (ref 0–1.2)
BUN SERPL-MCNC: 20 MG/DL (ref 6–24)
BUN/CREAT SERPL: 24 (ref 9–23)
CALCIUM SERPL-MCNC: 9.4 MG/DL (ref 8.7–10.2)
CHLORIDE SERPL-SCNC: 105 MMOL/L (ref 96–106)
CO2 SERPL-SCNC: 25 MMOL/L (ref 20–29)
CREAT SERPL-MCNC: 0.85 MG/DL (ref 0.57–1)
EOSINOPHIL # BLD AUTO: 0.1 X10E3/UL (ref 0–0.4)
EOSINOPHIL NFR BLD AUTO: 2 %
ERYTHROCYTE [DISTWIDTH] IN BLOOD BY AUTOMATED COUNT: 12.6 % (ref 11.7–15.4)
GLOBULIN SER CALC-MCNC: 2.4 G/DL (ref 1.5–4.5)
GLUCOSE SERPL-MCNC: 91 MG/DL (ref 65–99)
HCT VFR BLD AUTO: 37.8 % (ref 34–46.6)
HGB BLD-MCNC: 13.4 G/DL (ref 11.1–15.9)
IMM GRANULOCYTES # BLD AUTO: 0 X10E3/UL (ref 0–0.1)
IMM GRANULOCYTES NFR BLD AUTO: 0 %
INR PPP: 1 (ref 0.9–1.2)
LAB CORP EGFR IF AFRICN AM: 88 ML/MIN/1.73
LAB CORP EGFR IF NONAFRICN AM: 76 ML/MIN/1.73
LYMPHOCYTES # BLD AUTO: 1.4 X10E3/UL (ref 0.7–3.1)
LYMPHOCYTES NFR BLD AUTO: 21 %
MCH RBC QN AUTO: 32.2 PG (ref 26.6–33)
MCHC RBC AUTO-ENTMCNC: 35.4 G/DL (ref 31.5–35.7)
MCV RBC AUTO: 91 FL (ref 79–97)
MONOCYTES # BLD AUTO: 0.5 X10E3/UL (ref 0.1–0.9)
MONOCYTES NFR BLD AUTO: 7 %
NEUTROPHILS # BLD AUTO: 4.7 X10E3/UL (ref 1.4–7)
NEUTROPHILS NFR BLD AUTO: 69 %
PLATELET # BLD AUTO: 278 X10E3/UL (ref 150–450)
POTASSIUM SERPL-SCNC: 4.3 MMOL/L (ref 3.5–5.2)
PROT SERPL-MCNC: 6.9 G/DL (ref 6–8.5)
PROTHROMBIN TIME: 10.5 SEC (ref 9.1–12)
RBC # BLD AUTO: 4.16 X10E6/UL (ref 3.77–5.28)
SODIUM SERPL-SCNC: 139 MMOL/L (ref 134–144)
WBC # BLD AUTO: 6.7 X10E3/UL (ref 3.4–10.8)

## 2021-06-18 NOTE — TELEPHONE ENCOUNTER
Medicine Refill Request    Last Office Visit: Visit date not found  Last Telemedicine Visit: 10/21/2020 Liang Gibbs,     Next Office Visit: Visit date not found  Next Telemedicine Visit: Visit date not found         Current Outpatient Medications:   •  ammonium lactate (AMLACTIN) 12 % cream, Apply topically as needed for dry skin., Disp: 385 g, Rfl: 0  •  baclofen (LIORESAL) 10 mg tablet, Take 1 tablet (10 mg total) by mouth 2 (two) times a day. (Patient not taking: Reported on 6/10/2020 ), Disp: 180 tablet, Rfl: 1  •  clobetasoL (TEMOVATE) 0.05 % external solution, Apply topically 2 (two) times a day., Disp: 50 mL, Rfl: 3  •  cloNIDine (CATAPRES) 0.1 mg tablet, Take 1 tablet (0.1 mg total) by mouth once daily., Disp: 90 tablet, Rfl: 1  •  LORazepam (ATIVAN) 0.5 mg tablet, Take 1 tablet (0.5 mg total) by mouth 3 (three) times a day., Disp: 90 tablet, Rfl: 3  •  melatonin 5 mg tablet, Take 5 mg by mouth nightly.  , Disp: , Rfl:   •  meloxicam (MOBIC) 15 mg tablet, Take 15 mg by mouth daily. Stop on 05/24/21 for surgery , Disp: , Rfl:   •  multivitamin (THERAGRAN) tablet, take 1 tablet by oral route  every day, Disp: , Rfl:   •  omeprazole (PriLOSEC) 40 mg capsule, Take 1 capsule (40 mg total) by mouth daily before breakfast., Disp: 90 capsule, Rfl: 1  •  PARoxetine (PAXIL) 10 mg tablet, Take 1 tablet (10 mg total) by mouth daily., Disp: 90 tablet, Rfl: 1  •  traZODone (DESYREL) 100 mg tablet, TAKE 2 TABLETS AT NIGHT, Disp: 180 tablet, Rfl: 1      BP Readings from Last 3 Encounters:   05/24/21 124/72   06/10/20 120/78   11/04/19 (!) 141/74       Recent Lab results:  Lab Results   Component Value Date    CHOL  10/27/2020      Comment:      See Scanned Result.     ,   Lab Results   Component Value Date    HDL  10/27/2020      Comment:      See Scanned Result.     ,   Lab Results   Component Value Date    LDLCALC  10/27/2020      Comment:      See Scanned Result.     ,   Lab Results   Component Value Date    TRIG   10/27/2020      Comment:      See Scanned Result.            Lab Results   Component Value Date    GLUCOSE 91 05/24/2021   , No results found for: HGBA1C      Lab Results   Component Value Date    CREATININE 0.85 05/24/2021       No results found for: TSH

## 2021-06-20 RX ORDER — CLONIDINE HYDROCHLORIDE 0.1 MG/1
0.1 TABLET ORAL
Qty: 90 TABLET | Refills: 1 | Status: SHIPPED | OUTPATIENT
Start: 2021-06-20 | End: 2022-06-09

## 2021-06-20 RX ORDER — TRAZODONE HYDROCHLORIDE 100 MG/1
200 TABLET ORAL NIGHTLY
Qty: 180 TABLET | Refills: 1 | Status: SHIPPED | OUTPATIENT
Start: 2021-06-20 | End: 2022-08-10

## 2021-06-23 ENCOUNTER — DOCUMENTATION (OUTPATIENT)
Dept: OCCUPATIONAL THERAPY | Facility: HOSPITAL | Age: 56
End: 2021-06-23

## 2021-06-23 NOTE — PROGRESS NOTES
OT DISCHARGE NOTE FOR OUTPATIENT THERAPY    Patient: Debbie Kowalski   MRN: [id number]  : 1965 55 y.o.  Referring Physician: No ref. provider found  Date of Visit: 2021      Certification Dates:2/10/21  2/25/21       Total Visit Count: 2    Chief Complaints:   Chief Complaint   Patient presents with   • Did not return                         Goals Addressed                 This Visit's Progress    • COMPLETED: Other Goal        STG: Pt will increase AROM in 5* increments to 90 % full  In 6 weeks  STG:  Increase wrist strength   LTG: Pt will achieve ADL [place] using L hand in 12 weeks  LTG: Pt will achieve FAROM L hand in 12 weeks

## 2021-08-20 DIAGNOSIS — F41.9 ANXIETY: ICD-10-CM

## 2021-08-20 DIAGNOSIS — F32.A DEPRESSION, UNSPECIFIED DEPRESSION TYPE: ICD-10-CM

## 2021-08-20 RX ORDER — LORAZEPAM 0.5 MG/1
0.5 TABLET ORAL
Qty: 90 TABLET | Refills: 2 | Status: SHIPPED | OUTPATIENT
Start: 2021-08-20 | End: 2021-10-21 | Stop reason: SDUPTHER

## 2021-08-20 NOTE — TELEPHONE ENCOUNTER
Medicine Refill Request    Last Office Visit: Visit date not found  Last Telemedicine Visit: 10/21/2020 Liang Gibbs, DO    Next Office Visit: Visit date not found  Next Telemedicine Visit: Visit date not found         Current Outpatient Medications:   •  ammonium lactate (AMLACTIN) 12 % cream, Apply topically as needed for dry skin., Disp: 385 g, Rfl: 0  •  baclofen (LIORESAL) 10 mg tablet, Take 1 tablet (10 mg total) by mouth 2 (two) times a day. (Patient not taking: Reported on 6/10/2020 ), Disp: 180 tablet, Rfl: 1  •  clobetasoL (TEMOVATE) 0.05 % external solution, Apply topically 2 (two) times a day., Disp: 50 mL, Rfl: 3  •  cloNIDine (CATAPRES) 0.1 mg tablet, Take 1 tablet (0.1 mg total) by mouth once daily., Disp: 90 tablet, Rfl: 1  •  LORazepam (ATIVAN) 0.5 mg tablet, Take 1 tablet (0.5 mg total) by mouth 3 (three) times a day., Disp: 90 tablet, Rfl: 3  •  melatonin 5 mg tablet, Take 5 mg by mouth nightly.  , Disp: , Rfl:   •  meloxicam (MOBIC) 15 mg tablet, Take 15 mg by mouth daily. Stop on 05/24/21 for surgery , Disp: , Rfl:   •  multivitamin (THERAGRAN) tablet, take 1 tablet by oral route  every day, Disp: , Rfl:   •  omeprazole (PriLOSEC) 40 mg capsule, Take 1 capsule (40 mg total) by mouth daily before breakfast., Disp: 90 capsule, Rfl: 1  •  PARoxetine (PAXIL) 10 mg tablet, Take 1 tablet (10 mg total) by mouth daily., Disp: 90 tablet, Rfl: 1  •  traZODone (DESYREL) 100 mg tablet, Take 2 tablets (200 mg total) by mouth nightly., Disp: 180 tablet, Rfl: 1      BP Readings from Last 3 Encounters:   05/24/21 124/72   06/10/20 120/78   11/04/19 (!) 141/74       Recent Lab results:  Lab Results   Component Value Date    CHOL  10/27/2020      Comment:      See Scanned Result.     ,   Lab Results   Component Value Date    HDL  10/27/2020      Comment:      See Scanned Result.     ,   Lab Results   Component Value Date    LDLCALC  10/27/2020      Comment:      See Scanned Result.     ,   Lab Results    Component Value Date    TRIG  10/27/2020      Comment:      See Scanned Result.            Lab Results   Component Value Date    GLUCOSE 91 05/24/2021   , No results found for: HGBA1C      Lab Results   Component Value Date    CREATININE 0.85 05/24/2021       No results found for: TSH

## 2021-09-03 RX ORDER — PAROXETINE 10 MG/1
TABLET, FILM COATED ORAL
Qty: 90 TABLET | Refills: 1 | Status: SHIPPED | OUTPATIENT
Start: 2021-09-03 | End: 2021-09-07

## 2021-09-03 NOTE — TELEPHONE ENCOUNTER
Medicine Refill Request    Last Office Visit: Visit date not found  Last Telemedicine Visit: 10/21/2020 Liang Gibbs, DO    Next Office Visit: Visit date not found  Next Telemedicine Visit: Visit date not found         Current Outpatient Medications:   •  ammonium lactate (AMLACTIN) 12 % cream, Apply topically as needed for dry skin., Disp: 385 g, Rfl: 0  •  baclofen (LIORESAL) 10 mg tablet, Take 1 tablet (10 mg total) by mouth 2 (two) times a day. (Patient not taking: Reported on 6/10/2020 ), Disp: 180 tablet, Rfl: 1  •  clobetasoL (TEMOVATE) 0.05 % external solution, Apply topically 2 (two) times a day., Disp: 50 mL, Rfl: 3  •  cloNIDine (CATAPRES) 0.1 mg tablet, Take 1 tablet (0.1 mg total) by mouth once daily., Disp: 90 tablet, Rfl: 1  •  LORazepam (ATIVAN) 0.5 mg tablet, Take 1 tablet (0.5 mg total) by mouth 3 (three) times a day., Disp: 90 tablet, Rfl: 2  •  melatonin 5 mg tablet, Take 5 mg by mouth nightly.  , Disp: , Rfl:   •  meloxicam (MOBIC) 15 mg tablet, Take 15 mg by mouth daily. Stop on 05/24/21 for surgery , Disp: , Rfl:   •  multivitamin (THERAGRAN) tablet, take 1 tablet by oral route  every day, Disp: , Rfl:   •  omeprazole (PriLOSEC) 40 mg capsule, Take 1 capsule (40 mg total) by mouth daily before breakfast., Disp: 90 capsule, Rfl: 1  •  PARoxetine (PAXIL) 10 mg tablet, Take 1 tablet (10 mg total) by mouth daily., Disp: 90 tablet, Rfl: 1  •  traZODone (DESYREL) 100 mg tablet, Take 2 tablets (200 mg total) by mouth nightly., Disp: 180 tablet, Rfl: 1      BP Readings from Last 3 Encounters:   05/24/21 124/72   06/10/20 120/78   11/04/19 (!) 141/74       Recent Lab results:  Lab Results   Component Value Date    CHOL  10/27/2020      Comment:      See Scanned Result.     ,   Lab Results   Component Value Date    HDL  10/27/2020      Comment:      See Scanned Result.     ,   Lab Results   Component Value Date    LDLCALC  10/27/2020      Comment:      See Scanned Result.     ,   Lab Results    Component Value Date    TRIG  10/27/2020      Comment:      See Scanned Result.            Lab Results   Component Value Date    GLUCOSE 91 05/24/2021   , No results found for: HGBA1C      Lab Results   Component Value Date    CREATININE 0.85 05/24/2021       No results found for: TSH

## 2021-09-07 RX ORDER — PAROXETINE 10 MG/1
TABLET, FILM COATED ORAL
Qty: 90 TABLET | Refills: 1 | Status: SHIPPED | OUTPATIENT
Start: 2021-09-07 | End: 2022-02-21

## 2021-09-16 ENCOUNTER — TELEPHONE (OUTPATIENT)
Dept: FAMILY MEDICINE | Facility: CLINIC | Age: 56
End: 2021-09-16

## 2021-10-20 DIAGNOSIS — F41.9 ANXIETY: ICD-10-CM

## 2021-10-20 DIAGNOSIS — F32.A DEPRESSION, UNSPECIFIED DEPRESSION TYPE: ICD-10-CM

## 2021-10-20 NOTE — TELEPHONE ENCOUNTER
Medicine Refill Request    Last Office Visit: Visit date not found  Last Telemedicine Visit: 10/21/2020 Liang Gibbs, DO    Next Office Visit: Visit date not found  Next Telemedicine Visit: Visit date not found         Current Outpatient Medications:   •  ammonium lactate (AMLACTIN) 12 % cream, Apply topically as needed for dry skin., Disp: 385 g, Rfl: 0  •  baclofen (LIORESAL) 10 mg tablet, Take 1 tablet (10 mg total) by mouth 2 (two) times a day. (Patient not taking: Reported on 6/10/2020 ), Disp: 180 tablet, Rfl: 1  •  clobetasoL (TEMOVATE) 0.05 % external solution, Apply topically 2 (two) times a day., Disp: 50 mL, Rfl: 3  •  cloNIDine (CATAPRES) 0.1 mg tablet, Take 1 tablet (0.1 mg total) by mouth once daily., Disp: 90 tablet, Rfl: 1  •  LORazepam (ATIVAN) 0.5 mg tablet, Take 1 tablet (0.5 mg total) by mouth 3 (three) times a day., Disp: 90 tablet, Rfl: 2  •  melatonin 5 mg tablet, Take 5 mg by mouth nightly.  , Disp: , Rfl:   •  meloxicam (MOBIC) 15 mg tablet, Take 15 mg by mouth daily. Stop on 05/24/21 for surgery , Disp: , Rfl:   •  multivitamin (THERAGRAN) tablet, take 1 tablet by oral route  every day, Disp: , Rfl:   •  omeprazole (PriLOSEC) 40 mg capsule, Take 1 capsule (40 mg total) by mouth daily before breakfast., Disp: 90 capsule, Rfl: 1  •  PARoxetine (PAXIL) 10 mg tablet, TAKE 1 TABLET BY MOUTH EVERY DAY, Disp: 90 tablet, Rfl: 1  •  traZODone (DESYREL) 100 mg tablet, Take 2 tablets (200 mg total) by mouth nightly., Disp: 180 tablet, Rfl: 1      BP Readings from Last 3 Encounters:   05/24/21 124/72   06/10/20 120/78   11/04/19 (!) 141/74       Recent Lab results:  Lab Results   Component Value Date    CHOL  10/27/2020      Comment:      See Scanned Result.     ,   Lab Results   Component Value Date    HDL  10/27/2020      Comment:      See Scanned Result.     ,   Lab Results   Component Value Date    LDLCALC  10/27/2020      Comment:      See Scanned Result.     ,   Lab Results   Component Value  Date    TRIG  10/27/2020      Comment:      See Scanned Result.            Lab Results   Component Value Date    GLUCOSE 91 05/24/2021   , No results found for: HGBA1C      Lab Results   Component Value Date    CREATININE 0.85 05/24/2021       No results found for: TSH

## 2021-10-21 RX ORDER — LORAZEPAM 0.5 MG/1
0.5 TABLET ORAL
Qty: 90 TABLET | Refills: 2 | Status: SHIPPED | OUTPATIENT
Start: 2021-10-21 | End: 2022-01-05 | Stop reason: SDUPTHER

## 2021-10-21 RX ORDER — LORAZEPAM 0.5 MG/1
TABLET ORAL
Qty: 90 TABLET | Refills: 1 | OUTPATIENT
Start: 2021-10-21

## 2022-01-05 ENCOUNTER — OFFICE VISIT (OUTPATIENT)
Dept: FAMILY MEDICINE | Facility: CLINIC | Age: 57
End: 2022-01-05
Payer: COMMERCIAL

## 2022-01-05 VITALS
OXYGEN SATURATION: 97 % | TEMPERATURE: 98.3 F | SYSTOLIC BLOOD PRESSURE: 110 MMHG | BODY MASS INDEX: 27.15 KG/M2 | WEIGHT: 173 LBS | DIASTOLIC BLOOD PRESSURE: 70 MMHG | RESPIRATION RATE: 18 BRPM | HEIGHT: 67 IN | HEART RATE: 62 BPM

## 2022-01-05 DIAGNOSIS — F32.A DEPRESSION, UNSPECIFIED DEPRESSION TYPE: ICD-10-CM

## 2022-01-05 DIAGNOSIS — G90.522 COMPLEX REGIONAL PAIN SYNDROME TYPE 1 OF LEFT LOWER EXTREMITY: ICD-10-CM

## 2022-01-05 DIAGNOSIS — Z13.9 SCREENING FOR CONDITION: ICD-10-CM

## 2022-01-05 DIAGNOSIS — H69.93 EUSTACHIAN TUBE DISORDER, BILATERAL: ICD-10-CM

## 2022-01-05 DIAGNOSIS — L20.9 ATOPIC DERMATITIS, UNSPECIFIED TYPE: Primary | ICD-10-CM

## 2022-01-05 DIAGNOSIS — F41.9 ANXIETY: ICD-10-CM

## 2022-01-05 PROCEDURE — 3008F BODY MASS INDEX DOCD: CPT | Performed by: NURSE PRACTITIONER

## 2022-01-05 PROCEDURE — 99396 PREV VISIT EST AGE 40-64: CPT | Performed by: NURSE PRACTITIONER

## 2022-01-05 RX ORDER — LORAZEPAM 0.5 MG/1
0.5 TABLET ORAL
Qty: 90 TABLET | Refills: 2 | Status: SHIPPED | OUTPATIENT
Start: 2022-01-05 | End: 2022-08-08

## 2022-01-05 RX ORDER — BACLOFEN 10 MG/1
10 TABLET ORAL 3 TIMES DAILY
Qty: 270 TABLET | Refills: 1 | Status: SHIPPED | OUTPATIENT
Start: 2022-01-05 | End: 2022-10-20

## 2022-01-05 RX ORDER — CLOBETASOL PROPIONATE 0.5 MG/ML
SOLUTION TOPICAL 2 TIMES DAILY
Qty: 50 ML | Refills: 3 | Status: SHIPPED | OUTPATIENT
Start: 2022-01-05 | End: 2023-01-05

## 2022-01-05 RX ORDER — TRIAMCINOLONE ACETONIDE 1 MG/G
1 OINTMENT TOPICAL 2 TIMES DAILY
Qty: 80 G | Refills: 1 | Status: SHIPPED | OUTPATIENT
Start: 2022-01-05 | End: 2022-10-20

## 2022-01-05 ASSESSMENT — PATIENT HEALTH QUESTIONNAIRE - PHQ9: SUM OF ALL RESPONSES TO PHQ9 QUESTIONS 1 & 2: 4

## 2022-01-05 NOTE — PROGRESS NOTES
"    Patient ID: Debbie Kowalski is a 56 y.o. female.    HPI    Patient here today for annual visit  Bone spurs b/l heels   Dr. Jalloh  1/6/22   RSDLLE stiff from thigh to ankle   Chiropractor in [place]  B/L ear fluid         Current Outpatient Medications:   •  cloNIDine (CATAPRES) 0.1 mg tablet, Take 1 tablet (0.1 mg total) by mouth once daily., Disp: 90 tablet, Rfl: 1  •  LORazepam 0.5 mg tablet, Take 1 tablet (0.5 mg total) by mouth 3 (three) times a day., Disp: 90 tablet, Rfl: 2  •  melatonin 5 mg tablet, Take 5 mg by mouth nightly.  , Disp: , Rfl:   •  meloxicam (MOBIC) 15 mg tablet, Take 15 mg by mouth daily. Stop on 05/24/21 for surgery , Disp: , Rfl:   •  multivitamin (THERAGRAN) tablet, take 1 tablet by oral route  every day, Disp: , Rfl:   •  PARoxetine (PAXIL) 10 mg tablet, TAKE 1 TABLET BY MOUTH EVERY DAY, Disp: 90 tablet, Rfl: 1  •  traZODone (DESYREL) 100 mg tablet, Take 2 tablets (200 mg total) by mouth nightly., Disp: 180 tablet, Rfl: 1  •  ammonium lactate (AMLACTIN) 12 % cream, Apply topically as needed for dry skin. (Patient not taking: Reported on 1/5/2022 ), Disp: 385 g, Rfl: 0  •  baclofen 10 mg tablet, Take 1 tablet (10 mg total) by mouth 3 (three) times a day., Disp: 270 tablet, Rfl: 1  •  clobetasoL 0.05 % external solution, Apply topically 2 (two) times a day., Disp: 50 mL, Rfl: 3  •  omeprazole (PriLOSEC) 40 mg capsule, Take 1 capsule (40 mg total) by mouth daily before breakfast., Disp: 90 capsule, Rfl: 1  •  triamcinolone 0.1 % ointment, Apply 1 application topically 2 (two) times a day., Disp: 80 g, Rfl: 1      Review of Systems   All other systems reviewed and are negative.        Vitals:    01/05/22 0942   BP: 110/70   BP Location: Left upper arm   Patient Position: Sitting   Pulse: 62   Resp: 18   Temp: 36.8 °C (98.3 °F)   SpO2: 97%   Weight: 78.5 kg (173 lb)   Height: 1.702 m (5' 7\")     Body mass index is 27.1 kg/m².    Physical Exam  Vitals reviewed.   Constitutional:       " Appearance: Normal appearance.   Cardiovascular:      Rate and Rhythm: Normal rate and regular rhythm.      Heart sounds: Normal heart sounds.   Pulmonary:      Effort: Pulmonary effort is normal.      Breath sounds: Normal breath sounds.   Abdominal:      General: Bowel sounds are normal.      Palpations: Abdomen is soft.   Musculoskeletal:      Cervical back: Normal range of motion and neck supple.      Comments: Ambulates with crutch cane   Skin:     General: Skin is warm and dry.   Psychiatric:         Mood and Affect: Mood normal.         Behavior: Behavior normal.         Thought Content: Thought content normal.         Judgment: Judgment normal.         Assessment/Plan   Diagnoses and all orders for this visit:    Atopic dermatitis, unspecified type (Primary)  -     triamcinolone 0.1 % ointment; Apply 1 application topically 2 (two) times a day.  -     clobetasoL 0.05 % external solution; Apply topically 2 (two) times a day.    Eustachian tube disorder, bilateral  Trial of fluticasone nasal spray 1 spray per nostril daily for minimum of 14 days  Complex regional pain syndrome type 1 of left lower extremity   Controlled, we will continue current regimen  -     baclofen 10 mg tablet; Take 1 tablet (10 mg total) by mouth 3 (three) times a day.    Anxiety   Controlled, continue current regimen  -     LORazepam 0.5 mg tablet; Take 1 tablet (0.5 mg total) by mouth 3 (three) times a day.    Depression, unspecified depression type  -     LORazepam 0.5 mg tablet; Take 1 tablet (0.5 mg total) by mouth 3 (three) times a day.    Screening for condition  -     CBC and differential; Future  -     Comprehensive metabolic panel; Future  -     Lipid panel; Future  -     TSH w reflex FT4; Future

## 2022-02-16 ENCOUNTER — TELEPHONE (OUTPATIENT)
Dept: FAMILY MEDICINE | Facility: CLINIC | Age: 57
End: 2022-02-16
Payer: COMMERCIAL

## 2022-02-16 NOTE — TELEPHONE ENCOUNTER
Pt called and said that she is having an issue with hyper sweating all over that has be ongoing for about 3 weeks. Pt is not in [place] to do a telemed or office visit but would like to speak with doc. Pt can be reached at [phone]

## 2022-02-16 NOTE — TELEPHONE ENCOUNTER
RN spoke to patient. Discussed with Dr Gibbs. Patient advised to obtain evaluation where she is located.

## 2022-02-21 RX ORDER — PAROXETINE 10 MG/1
TABLET, FILM COATED ORAL
Qty: 90 TABLET | Refills: 1 | Status: SHIPPED | OUTPATIENT
Start: 2022-02-21 | End: 2022-08-02

## 2022-02-21 NOTE — TELEPHONE ENCOUNTER
Medicine Refill Request    Last Office: 1/5/2022   Last Consult Visit: 5/24/2021  Last Telemedicine Visit: 10/21/2020 Liang Gibbs, DO    Next Appointment: Visit date not found      Current Outpatient Medications:   •  ammonium lactate (AMLACTIN) 12 % cream, Apply topically as needed for dry skin. (Patient not taking: Reported on 1/5/2022 ), Disp: 385 g, Rfl: 0  •  baclofen 10 mg tablet, Take 1 tablet (10 mg total) by mouth 3 (three) times a day., Disp: 270 tablet, Rfl: 1  •  clobetasoL 0.05 % external solution, Apply topically 2 (two) times a day., Disp: 50 mL, Rfl: 3  •  cloNIDine (CATAPRES) 0.1 mg tablet, Take 1 tablet (0.1 mg total) by mouth once daily., Disp: 90 tablet, Rfl: 1  •  LORazepam 0.5 mg tablet, Take 1 tablet (0.5 mg total) by mouth 3 (three) times a day., Disp: 90 tablet, Rfl: 2  •  melatonin 5 mg tablet, Take 5 mg by mouth nightly.  , Disp: , Rfl:   •  meloxicam (MOBIC) 15 mg tablet, Take 15 mg by mouth daily. Stop on 05/24/21 for surgery , Disp: , Rfl:   •  multivitamin (THERAGRAN) tablet, take 1 tablet by oral route  every day, Disp: , Rfl:   •  omeprazole (PriLOSEC) 40 mg capsule, Take 1 capsule (40 mg total) by mouth daily before breakfast., Disp: 90 capsule, Rfl: 1  •  PARoxetine (PAXIL) 10 mg tablet, TAKE 1 TABLET BY MOUTH EVERY DAY, Disp: 90 tablet, Rfl: 1  •  traZODone (DESYREL) 100 mg tablet, Take 2 tablets (200 mg total) by mouth nightly., Disp: 180 tablet, Rfl: 1  •  triamcinolone 0.1 % ointment, Apply 1 application topically 2 (two) times a day., Disp: 80 g, Rfl: 1      BP Readings from Last 3 Encounters:   01/05/22 110/70   05/24/21 124/72   06/10/20 120/78       Recent Lab results:  Lab Results   Component Value Date    CHOL  10/27/2020      Comment:      See Scanned Result.     ,   Lab Results   Component Value Date    HDL  10/27/2020      Comment:      See Scanned Result.     ,   Lab Results   Component Value Date    LDLCALC  10/27/2020      Comment:      See Scanned Result.     ,    Lab Results   Component Value Date    TRIG  10/27/2020      Comment:      See Scanned Result.            Lab Results   Component Value Date    GLUCOSE 91 05/24/2021   , No results found for: HGBA1C      Lab Results   Component Value Date    CREATININE 0.85 05/24/2021       No results found for: TSH

## 2022-05-04 ENCOUNTER — OFFICE VISIT (OUTPATIENT)
Dept: FAMILY MEDICINE | Facility: CLINIC | Age: 57
End: 2022-05-04
Payer: COMMERCIAL

## 2022-05-04 VITALS
WEIGHT: 184 LBS | RESPIRATION RATE: 16 BRPM | TEMPERATURE: 98.4 F | SYSTOLIC BLOOD PRESSURE: 120 MMHG | OXYGEN SATURATION: 98 % | HEIGHT: 67 IN | HEART RATE: 61 BPM | DIASTOLIC BLOOD PRESSURE: 74 MMHG | BODY MASS INDEX: 28.88 KG/M2

## 2022-05-04 DIAGNOSIS — D64.9 ANEMIA, UNSPECIFIED TYPE: ICD-10-CM

## 2022-05-04 DIAGNOSIS — Z13.220 LIPID SCREENING: ICD-10-CM

## 2022-05-04 DIAGNOSIS — M25.512 CHRONIC LEFT SHOULDER PAIN: Primary | ICD-10-CM

## 2022-05-04 DIAGNOSIS — F41.9 ANXIETY AND DEPRESSION: ICD-10-CM

## 2022-05-04 DIAGNOSIS — E03.8 OTHER SPECIFIED HYPOTHYROIDISM: ICD-10-CM

## 2022-05-04 DIAGNOSIS — G89.29 CHRONIC LEFT SHOULDER PAIN: Primary | ICD-10-CM

## 2022-05-04 DIAGNOSIS — D05.12 DUCTAL CARCINOMA IN SITU (DCIS) OF LEFT BREAST: ICD-10-CM

## 2022-05-04 DIAGNOSIS — F32.A ANXIETY AND DEPRESSION: ICD-10-CM

## 2022-05-04 DIAGNOSIS — G90.522 COMPLEX REGIONAL PAIN SYNDROME TYPE 1 OF LEFT LOWER EXTREMITY: ICD-10-CM

## 2022-05-04 DIAGNOSIS — E55.9 VITAMIN D DEFICIENCY: ICD-10-CM

## 2022-05-04 DIAGNOSIS — G43.019 INTRACTABLE MIGRAINE WITHOUT AURA AND WITHOUT STATUS MIGRAINOSUS: ICD-10-CM

## 2022-05-04 PROCEDURE — 99214 OFFICE O/P EST MOD 30 MIN: CPT | Performed by: INTERNAL MEDICINE

## 2022-05-04 PROCEDURE — 3008F BODY MASS INDEX DOCD: CPT | Performed by: INTERNAL MEDICINE

## 2022-05-04 RX ORDER — HYDROCODONE BITARTRATE AND ACETAMINOPHEN 5; 325 MG/1; MG/1
1 TABLET ORAL EVERY 6 HOURS PRN
Qty: 45 TABLET | Refills: 0 | Status: SHIPPED | OUTPATIENT
Start: 2022-05-04 | End: 2022-05-14

## 2022-05-04 RX ORDER — DOXYCYCLINE HYCLATE 100 MG
100 TABLET ORAL DAILY
Qty: 14 TABLET | Refills: 0 | Status: SHIPPED | OUTPATIENT
Start: 2022-05-04 | End: 2022-05-18

## 2022-05-04 RX ORDER — HYDROCODONE BITARTRATE AND ACETAMINOPHEN 5; 325 MG/1; MG/1
1 TABLET ORAL AS NEEDED
COMMUNITY
End: 2022-08-08
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
## 2022-05-13 LAB
25(OH)D3+25(OH)D2 SERPL-MCNC: 49 NG/ML (ref 30–100)
ALBUMIN SERPL-MCNC: 4.5 G/DL (ref 3.8–4.9)
ALBUMIN/GLOB SERPL: 2 {RATIO} (ref 1.2–2.2)
ALP SERPL-CCNC: 109 IU/L (ref 44–121)
ALT SERPL-CCNC: 21 IU/L (ref 0–32)
AST SERPL-CCNC: 19 IU/L (ref 0–40)
BASOPHILS # BLD AUTO: 0 X10E3/UL (ref 0–0.2)
BASOPHILS NFR BLD AUTO: 1 %
BILIRUB SERPL-MCNC: <0.2 MG/DL (ref 0–1.2)
BUN SERPL-MCNC: 20 MG/DL (ref 6–24)
BUN/CREAT SERPL: 25 (ref 9–23)
CALCIUM SERPL-MCNC: 9.3 MG/DL (ref 8.7–10.2)
CHLORIDE SERPL-SCNC: 103 MMOL/L (ref 96–106)
CHOLEST SERPL-MCNC: 261 MG/DL (ref 100–199)
CO2 SERPL-SCNC: 22 MMOL/L (ref 20–29)
CREAT SERPL-MCNC: 0.79 MG/DL (ref 0.57–1)
EGFRCR SERPLBLD CKD-EPI 2021: 87 ML/MIN/1.73
EOSINOPHIL # BLD AUTO: 0.3 X10E3/UL (ref 0–0.4)
EOSINOPHIL NFR BLD AUTO: 4 %
ERYTHROCYTE [DISTWIDTH] IN BLOOD BY AUTOMATED COUNT: 12.4 % (ref 11.7–15.4)
GLOBULIN SER CALC-MCNC: 2.3 G/DL (ref 1.5–4.5)
GLUCOSE SERPL-MCNC: 77 MG/DL (ref 65–99)
HCT VFR BLD AUTO: 35.5 % (ref 34–46.6)
HDLC SERPL-MCNC: 55 MG/DL
HGB BLD-MCNC: 12.3 G/DL (ref 11.1–15.9)
IMM GRANULOCYTES # BLD AUTO: 0 X10E3/UL (ref 0–0.1)
IMM GRANULOCYTES NFR BLD AUTO: 0 %
LDLC SERPL CALC-MCNC: 185 MG/DL (ref 0–99)
LYMPHOCYTES # BLD AUTO: 1.5 X10E3/UL (ref 0.7–3.1)
LYMPHOCYTES NFR BLD AUTO: 21 %
MCH RBC QN AUTO: 32.1 PG (ref 26.6–33)
MCHC RBC AUTO-ENTMCNC: 34.6 G/DL (ref 31.5–35.7)
MCV RBC AUTO: 93 FL (ref 79–97)
MONOCYTES # BLD AUTO: 0.5 X10E3/UL (ref 0.1–0.9)
MONOCYTES NFR BLD AUTO: 7 %
NEUTROPHILS # BLD AUTO: 4.6 X10E3/UL (ref 1.4–7)
NEUTROPHILS NFR BLD AUTO: 67 %
PLATELET # BLD AUTO: 272 X10E3/UL (ref 150–450)
POTASSIUM SERPL-SCNC: 4.6 MMOL/L (ref 3.5–5.2)
PROT SERPL-MCNC: 6.8 G/DL (ref 6–8.5)
RBC # BLD AUTO: 3.83 X10E6/UL (ref 3.77–5.28)
SODIUM SERPL-SCNC: 139 MMOL/L (ref 134–144)
T4 FREE SERPL-MCNC: 0.92 NG/DL (ref 0.82–1.77)
TRIGL SERPL-MCNC: 120 MG/DL (ref 0–149)
TSH SERPL DL<=0.005 MIU/L-ACNC: 1.08 UIU/ML (ref 0.45–4.5)
VLDLC SERPL CALC-MCNC: 21 MG/DL (ref 5–40)
WBC # BLD AUTO: 6.9 X10E3/UL (ref 3.4–10.8)

## 2022-05-14 ASSESSMENT — ENCOUNTER SYMPTOMS
HEMATOLOGIC/LYMPHATIC NEGATIVE: 1
MUSCULOSKELETAL NEGATIVE: 1
CARDIOVASCULAR NEGATIVE: 1
CONSTITUTIONAL NEGATIVE: 1
ENDOCRINE NEGATIVE: 1
PSYCHIATRIC NEGATIVE: 1
RESPIRATORY NEGATIVE: 1
ALLERGIC/IMMUNOLOGIC NEGATIVE: 1
EYES NEGATIVE: 1
GASTROINTESTINAL NEGATIVE: 1
NEUROLOGICAL NEGATIVE: 1

## 2022-06-08 ENCOUNTER — TELEPHONE (OUTPATIENT)
Dept: FAMILY MEDICINE | Facility: CLINIC | Age: 57
End: 2022-06-08
Payer: COMMERCIAL

## 2022-06-08 NOTE — TELEPHONE ENCOUNTER
Patient called regarding(name of test)?  Blood work  Patient name?    Debbie low  Phone number?    [phone]  Date of test?    05/12/2022  Where was test performed?    [facility]   Ordering physician?    Dr Gibbs

## 2022-06-09 NOTE — TELEPHONE ENCOUNTER
Left message for patient   Labs other than LDL within normal limits  LDL elevated   Lifestyle/dietary changes   Reduce saturated fats and simple starches ... increase fiber  Labs in 6 months

## 2022-07-28 ENCOUNTER — TELEPHONE (OUTPATIENT)
Dept: FAMILY MEDICINE | Facility: CLINIC | Age: 57
End: 2022-07-28
Payer: COMMERCIAL

## 2022-07-28 NOTE — TELEPHONE ENCOUNTER
RN spoke to patient. Reports symptoms started Tuesday. Tested pos for Covid with a home test. Reports head congestion, rhinorrhea, scratchy throat, cough that was productive yesterday.  Patient vaccinated for Covid with 1 booster. Discussed conservative measures, isolation and ED precautions. Discussed with Dr Cerrato. Patient advised of interactions with current medications, which would need to be adjusted. Patient will monitor symptoms and inform office if they worsen, wants Paxlovid prescribed.

## 2022-07-28 NOTE — TELEPHONE ENCOUNTER
Patient contacted office with? covid positive home test today 7/28  When did it start? Yesterday 7/27   Fever? No   Headache? yes  Nausea? no  Vomiting? No   Wheezing? No   Shortness of Breath? no  Congestion? Head congestion  Cough? yes  Have you tried any medicine? Tylenol for headache   If yes which medicine?  How long have you been taking it?     Sore throat, runny nose

## 2022-08-08 DIAGNOSIS — F41.9 ANXIETY: ICD-10-CM

## 2022-08-08 DIAGNOSIS — F32.A DEPRESSION, UNSPECIFIED DEPRESSION TYPE: ICD-10-CM

## 2022-08-08 RX ORDER — HYDROCODONE BITARTRATE AND ACETAMINOPHEN 5; 325 MG/1; MG/1
TABLET ORAL
Qty: 45 TABLET | Refills: 0 | Status: SHIPPED | OUTPATIENT
Start: 2022-08-08 | End: 2022-10-20 | Stop reason: SDUPTHER

## 2022-08-08 RX ORDER — LORAZEPAM 0.5 MG/1
0.5 TABLET ORAL
Qty: 90 TABLET | Refills: 0 | Status: SHIPPED | OUTPATIENT
Start: 2022-08-08 | End: 2022-10-20 | Stop reason: SDUPTHER

## 2022-08-08 NOTE — TELEPHONE ENCOUNTER
Medicine Refill Request    Last Office: 5/4/2022   Last Consult Visit: 5/24/2021  Last Telemedicine Visit: 10/21/2020 Liang Gibbs, DO    Next Appointment: Visit date not found      Current Outpatient Medications:   •  ammonium lactate (AMLACTIN) 12 % cream, Apply topically as needed for dry skin., Disp: 385 g, Rfl: 0  •  baclofen 10 mg tablet, Take 1 tablet (10 mg total) by mouth 3 (three) times a day., Disp: 270 tablet, Rfl: 1  •  clobetasoL 0.05 % external solution, Apply topically 2 (two) times a day., Disp: 50 mL, Rfl: 3  •  cloNIDine (CATAPRES) 0.1 mg tablet, TAKE 1 TABLET BY MOUTH ONCE DAILY. GENERIC EQUIVALENT FOR CATAPRES, Disp: 90 tablet, Rfl: 1  •  HYDROcodone-acetaminophen (NORCO) 5-325 mg per tablet, Take 1 tablet by mouth as needed., Disp: , Rfl:   •  LORazepam 0.5 mg tablet, Take 1 tablet (0.5 mg total) by mouth 3 (three) times a day., Disp: 90 tablet, Rfl: 2  •  melatonin 5 mg tablet, Take 5 mg by mouth nightly.  , Disp: , Rfl:   •  meloxicam (MOBIC) 15 mg tablet, Take 15 mg by mouth daily. Stop on 05/24/21 for surgery , Disp: , Rfl:   •  multivitamin (THERAGRAN) tablet, take 1 tablet by oral route  every day, Disp: , Rfl:   •  omeprazole (PriLOSEC) 40 mg capsule, Take 1 capsule (40 mg total) by mouth daily before breakfast., Disp: 90 capsule, Rfl: 1  •  PARoxetine (PAXIL) 10 mg tablet, Take 1 tablet (10 mg total) by mouth once daily., Disp: 90 tablet, Rfl: 1  •  traZODone (DESYREL) 100 mg tablet, Take 2 tablets (200 mg total) by mouth nightly., Disp: 180 tablet, Rfl: 1  •  triamcinolone 0.1 % ointment, Apply 1 application topically 2 (two) times a day., Disp: 80 g, Rfl: 1      BP Readings from Last 3 Encounters:   05/04/22 120/74   01/05/22 110/70   05/24/21 124/72       Recent Lab results:  Lab Results   Component Value Date    CHOL 261 (H) 05/12/2022   ,   Lab Results   Component Value Date    HDL 55 05/12/2022   ,   Lab Results   Component Value Date    LDLCALC 185 (H) 05/12/2022   ,   Lab  Results   Component Value Date    TRIG 120 05/12/2022        Lab Results   Component Value Date    GLUCOSE 77 05/12/2022   , No results found for: HGBA1C      Lab Results   Component Value Date    CREATININE 0.79 05/12/2022       Lab Results   Component Value Date    TSH 1.080 05/12/2022

## 2022-10-20 ENCOUNTER — OFFICE VISIT (OUTPATIENT)
Dept: FAMILY MEDICINE | Facility: CLINIC | Age: 57
End: 2022-10-20
Payer: COMMERCIAL

## 2022-10-20 VITALS
SYSTOLIC BLOOD PRESSURE: 100 MMHG | RESPIRATION RATE: 16 BRPM | WEIGHT: 175 LBS | HEIGHT: 67 IN | DIASTOLIC BLOOD PRESSURE: 70 MMHG | HEART RATE: 83 BPM | BODY MASS INDEX: 27.47 KG/M2 | OXYGEN SATURATION: 97 %

## 2022-10-20 DIAGNOSIS — G90.522 COMPLEX REGIONAL PAIN SYNDROME TYPE 1 OF LEFT LOWER EXTREMITY: ICD-10-CM

## 2022-10-20 DIAGNOSIS — G89.29 CHRONIC PAIN OF RIGHT ANKLE: ICD-10-CM

## 2022-10-20 DIAGNOSIS — F41.9 ANXIETY: ICD-10-CM

## 2022-10-20 DIAGNOSIS — F32.A DEPRESSION, UNSPECIFIED DEPRESSION TYPE: ICD-10-CM

## 2022-10-20 DIAGNOSIS — F32.A ANXIETY AND DEPRESSION: ICD-10-CM

## 2022-10-20 DIAGNOSIS — D05.12 DUCTAL CARCINOMA IN SITU (DCIS) OF LEFT BREAST: ICD-10-CM

## 2022-10-20 DIAGNOSIS — F41.9 ANXIETY AND DEPRESSION: ICD-10-CM

## 2022-10-20 DIAGNOSIS — M79.671 RIGHT FOOT PAIN: Primary | ICD-10-CM

## 2022-10-20 DIAGNOSIS — M25.571 CHRONIC PAIN OF RIGHT ANKLE: ICD-10-CM

## 2022-10-20 PROCEDURE — 3008F BODY MASS INDEX DOCD: CPT | Performed by: INTERNAL MEDICINE

## 2022-10-20 PROCEDURE — 99214 OFFICE O/P EST MOD 30 MIN: CPT | Performed by: INTERNAL MEDICINE

## 2022-10-20 RX ORDER — HYDROCODONE BITARTRATE AND ACETAMINOPHEN 5; 325 MG/1; MG/1
TABLET ORAL
Qty: 45 TABLET | Refills: 0 | Status: SHIPPED | OUTPATIENT
Start: 2022-10-20

## 2022-10-20 RX ORDER — HYDROCODONE BITARTRATE AND ACETAMINOPHEN 5; 325 MG/1; MG/1
TABLET ORAL
Qty: 45 TABLET | Refills: 0 | Status: SHIPPED | OUTPATIENT
Start: 2022-10-20 | End: 2022-10-20 | Stop reason: SDUPTHER

## 2022-10-20 RX ORDER — LORAZEPAM 0.5 MG/1
0.5 TABLET ORAL
Qty: 90 TABLET | Refills: 0 | Status: SHIPPED | OUTPATIENT
Start: 2022-10-20
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
## 2022-10-21 ASSESSMENT — ENCOUNTER SYMPTOMS
RESPIRATORY NEGATIVE: 1
CARDIOVASCULAR NEGATIVE: 1
GASTROINTESTINAL NEGATIVE: 1
CONSTITUTIONAL NEGATIVE: 1
ENDOCRINE NEGATIVE: 1
ALLERGIC/IMMUNOLOGIC NEGATIVE: 1
MUSCULOSKELETAL NEGATIVE: 1
PSYCHIATRIC NEGATIVE: 1
NEUROLOGICAL NEGATIVE: 1
EYES NEGATIVE: 1
HEMATOLOGIC/LYMPHATIC NEGATIVE: 1

## 2023-02-10 ENCOUNTER — NURSE TRIAGE (OUTPATIENT)
Dept: FAMILY MEDICINE | Facility: CLINIC | Age: 58
End: 2023-02-10
Payer: COMMERCIAL

## 2023-02-10 NOTE — TELEPHONE ENCOUNTER
Patient was in ED yesterday for back pain. They gave her RX for for Lidocaine patches. The patches need a prior auth. Recommend OTC Lidocaine 4% gel/patches for over the weekend. She will call office on Monday. Agrees with plan.

## 2023-02-17 ENCOUNTER — TELEPHONE (OUTPATIENT)
Dept: FAMILY MEDICINE | Facility: CLINIC | Age: 58
End: 2023-02-17
Payer: COMMERCIAL

## 2023-02-17 NOTE — TELEPHONE ENCOUNTER
Following up on revised doc faxed on 2/8, regarding pt's wheelchair.      Please call Macey or fax completed form to   [phone]

## 2023-02-27 ENCOUNTER — TELEPHONE (OUTPATIENT)
Dept: FAMILY MEDICINE | Facility: CLINIC | Age: 58
End: 2023-02-27
Payer: COMMERCIAL

## 2023-02-27 NOTE — TELEPHONE ENCOUNTER
Efe from [employer] called to follow up to make sure fax was received on 2/8. It was for a repair to the patients mobility device.  Please advise whether or not it was received.     # [phone].

## 2023-06-08 ENCOUNTER — TELEPHONE (OUTPATIENT)
Dept: FAMILY MEDICINE | Facility: CLINIC | Age: 58
End: 2023-06-08

## 2023-06-08 NOTE — TELEPHONE ENCOUNTER
[facility] Appointment Request   Provider: Dr. Gibbs   Appointment Type: OV  Reason for Visit: Patient would like to have her EPP  Available Day and Time:She will be available from  6/19/2023-7/7/2023 only   Best Contact Number:[phone]    The practice will reach out to schedule your appointment within the next 2 business days.

## 2023-06-30 RX ORDER — CLONIDINE HYDROCHLORIDE 0.1 MG/1
0.1 TABLET ORAL DAILY
Qty: 90 TABLET | Refills: 0 | Status: SHIPPED
Start: 2023-06-30 | End: 2023-09-19

## 2023-06-30 RX ORDER — PAROXETINE 10 MG/1
10 TABLET, FILM COATED ORAL DAILY
Qty: 90 TABLET | Refills: 0 | Status: SHIPPED | OUTPATIENT
Start: 2023-06-30 | End: 2023-09-26

## 2023-06-30 NOTE — TELEPHONE ENCOUNTER
Medicine Refill Request    Last Office: 10/20/2022   Last Consult Visit: 5/24/2021  Last Telemedicine Visit: 10/21/2020 Liang Gibbs, DO    Next Appointment: Visit date not found      Current Outpatient Medications:   •  ammonium lactate (AMLACTIN) 12 % cream, Apply topically as needed for dry skin., Disp: 385 g, Rfl: 0  •  baclofen 10 mg tablet, Take 1 tablet (10 mg total) by mouth 3 (three) times a day., Disp: 270 tablet, Rfl: 1  •  clobetasoL 0.05 % external solution, Apply topically 2 (two) times a day., Disp: 50 mL, Rfl: 3  •  cloNIDine (CATAPRES) 0.1 mg tablet, TAKE 1 TABLET BY MOUTH ONCE DAILY. GENERIC EQUIVALENT FOR CATAPRES, Disp: 90 tablet, Rfl: 1  •  HYDROcodone-acetaminophen (NORCO) 5-325 mg per tablet, take 1 tablet by mouth every 6 hours if needed for MODERATE PAIN FOR UP TO 10 DAYS Strength: 5-325 mg, Disp: 45 tablet, Rfl: 0  •  LORazepam (ATIVAN) 0.5 mg tablet, Take 1 tablet (0.5 mg total) by mouth 3 (three) times a day., Disp: 90 tablet, Rfl: 0  •  melatonin 5 mg tablet, Take 5 mg by mouth nightly.  , Disp: , Rfl:   •  meloxicam (MOBIC) 15 mg tablet, Take 15 mg by mouth daily. Stop on 05/24/21 for surgery , Disp: , Rfl:   •  multivitamin (THERAGRAN) tablet, take 1 tablet by oral route  every day, Disp: , Rfl:   •  omeprazole (PriLOSEC) 40 mg capsule, Take 1 capsule (40 mg total) by mouth daily before breakfast., Disp: 90 capsule, Rfl: 1  •  PARoxetine (PAXIL) 10 mg tablet, TAKE 1 TABLET BY MOUTH ONCE DAILY, Disp: 90 tablet, Rfl: 1  •  traZODone (DESYREL) 100 mg tablet, TAKE 2 TABLETS BY MOUTH NIGHTLY GENERIC EQUIVALENT FOR DESYREL, Disp: 180 tablet, Rfl: 1  •  triamcinolone 0.1 % ointment, Apply 1 application topically 2 (two) times a day., Disp: 80 g, Rfl: 1      BP Readings from Last 3 Encounters:   10/20/22 100/70   05/04/22 120/74   01/05/22 110/70       Recent Lab results:  Lab Results   Component Value Date    CHOL 261 (H) 05/12/2022   ,   Lab Results   Component Value Date    HDL 55  05/12/2022   ,   Lab Results   Component Value Date    LDLCALC 185 (H) 05/12/2022   ,   Lab Results   Component Value Date    TRIG 120 05/12/2022        Lab Results   Component Value Date    GLUCOSE 77 05/12/2022   , No results found for: HGBA1C      Lab Results   Component Value Date    CREATININE 0.79 05/12/2022       Lab Results   Component Value Date    TSH 1.080 05/12/2022

## 2023-07-28 ENCOUNTER — TELEPHONE (OUTPATIENT)
Dept: FAMILY MEDICINE | Facility: CLINIC | Age: 58
End: 2023-07-28
Payer: COMMERCIAL

## 2023-07-28 NOTE — LETTER
To Whom It May Concern:      Ms. Debbie Kowalski is a patient in our practice.  I have been Ms. Kowalski's primary care provider now for over 10 years.  Ms. Kowalski has several orthopedic and neurologic conditions for which I have recommended she obtain a handicap license. Please feel free to contact me regarding this matter with any questions or concerns. Thank you for your time and attention to this matter. Take care.    Sincerely,     Linag Gibbs D.O.

## 2023-07-28 NOTE — TELEPHONE ENCOUNTER
Patient needs a letter stating she requires a handicap tag for her vehicle. Request for call back.

## 2023-07-28 NOTE — TELEPHONE ENCOUNTER
Spoke to Debbie and told her Dr Gibbs is out of the office until next week she was fine to wait until next week

## 2023-07-31 NOTE — TELEPHONE ENCOUNTER
Good Morning      Can we please inquire if Ms. Kowalski requires a letter or a handicap placard form filled out. Thank you

## 2023-07-31 NOTE — TELEPHONE ENCOUNTER
Reached out to patient and had to leave a voice mail. Left a message to please call us back to let us know if patient needs an actual letter or a handicap placard form filled out.

## 2023-08-01 NOTE — TELEPHONE ENCOUNTER
Patient called back. She confirmed she only needs a letter stating her condition. Best contact number is [phone].

## 2023-08-02 NOTE — TELEPHONE ENCOUNTER
Spoke with patient, letter is needed for handicap license plate in [place].  Patient [place] home [place] well as [place] and needs to register her car.    Patient will retreive letter on patient portal

## 2023-08-02 NOTE — TELEPHONE ENCOUNTER
Reached out to patient and had to leave a voicemail. Left a message to patient to call back to the office so we can find out in detail what the letter needs to say, who the letter needs to be addressed to, etc.

## 2023-08-08 ENCOUNTER — TELEPHONE (OUTPATIENT)
Dept: FAMILY MEDICINE | Facility: CLINIC | Age: 58
End: 2023-08-08
Payer: COMMERCIAL

## 2023-08-08 NOTE — TELEPHONE ENCOUNTER
Patient received letter but was advised by [place] dept of transportation needs form completed as well.    Application for disabled person parking permit form received.    Patient is requesting form be sent through portal once signed.    Demographics are completed and form placed in bin on wall

## 2023-08-08 NOTE — TELEPHONE ENCOUNTER
Request for Status, Question, or Concern on a form already Submitted  Patient PCP: Liang Gibbs, DO  Form Title: Handicap Placard Form  Date form was Submitted: 8/7/23 @ 4:30 p.m.  How form was Submitted: Faxed  Encounter, labs, or testing results?: completion and signature from provider on form  Status, question, or concern on form?: pt seeking status of form she faxed over for completion. Pt states they advised that she needed the placard form along with the letter. Pt states she needs form completed before Monday. Pt asking if form can be put through [employer], if not able pt asking for an email or text as attachment. Email was verified in chart.    Please advise. Pt's# [phone]    The practice will reach out to discuss your form Status,  Question, or Concern within 2 business days.

## 2023-09-05 ENCOUNTER — APPOINTMENT (RX ONLY)
Dept: URBAN - METROPOLITAN AREA CLINIC 84 | Facility: CLINIC | Age: 58
Setting detail: DERMATOLOGY
End: 2023-09-05

## 2023-09-05 DIAGNOSIS — L71.8 OTHER ROSACEA: ICD-10-CM

## 2023-09-05 DIAGNOSIS — R20.2 PARESTHESIA OF SKIN: ICD-10-CM

## 2023-09-05 PROCEDURE — ? PRESCRIPTION

## 2023-09-05 PROCEDURE — 99204 OFFICE O/P NEW MOD 45 MIN: CPT

## 2023-09-05 PROCEDURE — ? COUNSELING

## 2023-09-05 RX ORDER — MINOCYCLINE HYDROCHLORIDE 100 MG/1
CAPSULE ORAL
Qty: 60 | Refills: 8 | Status: ERX | COMMUNITY
Start: 2023-09-05

## 2023-09-05 RX ORDER — TRIAMCINOLONE ACETONIDE 1 MG/G
CREAM TOPICAL BID
Qty: 453.6 | Refills: 3 | Status: ERX | COMMUNITY
Start: 2023-09-05

## 2023-09-05 RX ORDER — MUPIROCIN 20 MG/G
OINTMENT TOPICAL
Qty: 22 | Refills: 2 | Status: ERX | COMMUNITY
Start: 2023-09-05

## 2023-09-05 RX ORDER — GABAPENTIN 300 MG/1
CAPSULE ORAL
Qty: 60 | Refills: 2 | Status: ERX | COMMUNITY
Start: 2023-09-05

## 2023-09-05 RX ORDER — CLINDAMYCIN PHOSPHATE 10 MG/G
GEL TOPICAL DAILY
Qty: 60 | Refills: 3 | Status: ERX | COMMUNITY
Start: 2023-09-05

## 2023-09-05 RX ADMIN — GABAPENTIN: 300 CAPSULE ORAL at 00:00

## 2023-09-05 RX ADMIN — MUPIROCIN: 20 OINTMENT TOPICAL at 00:00

## 2023-09-05 RX ADMIN — CLINDAMYCIN PHOSPHATE: 10 GEL TOPICAL at 00:00

## 2023-09-05 RX ADMIN — TRIAMCINOLONE ACETONIDE: 1 CREAM TOPICAL at 00:00

## 2023-09-05 RX ADMIN — MINOCYCLINE HYDROCHLORIDE: 100 CAPSULE ORAL at 00:00

## 2023-09-05 ASSESSMENT — LOCATION ZONE DERM
LOCATION ZONE: TRUNK
LOCATION ZONE: FACE

## 2023-09-05 ASSESSMENT — LOCATION DETAILED DESCRIPTION DERM
LOCATION DETAILED: INFERIOR MID FOREHEAD
LOCATION DETAILED: RIGHT CENTRAL MALAR CHEEK
LOCATION DETAILED: LEFT INFERIOR CENTRAL MALAR CHEEK
LOCATION DETAILED: SUPERIOR LUMBAR SPINE
LOCATION DETAILED: LEFT MID-UPPER BACK

## 2023-09-05 ASSESSMENT — LOCATION SIMPLE DESCRIPTION DERM
LOCATION SIMPLE: RIGHT CHEEK
LOCATION SIMPLE: INFERIOR FOREHEAD
LOCATION SIMPLE: LEFT CHEEK
LOCATION SIMPLE: LEFT UPPER BACK
LOCATION SIMPLE: LOWER BACK

## 2023-09-19 RX ORDER — CLONIDINE HYDROCHLORIDE 0.1 MG/1
0.1 TABLET ORAL DAILY
Qty: 90 TABLET | Refills: 0 | Status: SHIPPED | OUTPATIENT
Start: 2023-09-19 | End: 2023-12-18

## 2023-09-19 NOTE — TELEPHONE ENCOUNTER
Medicine Refill Request    Last Office Visit: 10/20/2022   Last Consult Visit: 5/24/2021  Last Telemedicine Visit: 10/21/2020 Liang Gibbs,     Next Appointment: 1/2/2024      Current Outpatient Medications:     ammonium lactate (AMLACTIN) 12 % cream, Apply topically as needed for dry skin., Disp: 385 g, Rfl: 0    baclofen 10 mg tablet, Take 1 tablet (10 mg total) by mouth 3 (three) times a day., Disp: 270 tablet, Rfl: 1    clobetasoL 0.05 % external solution, Apply topically 2 (two) times a day., Disp: 50 mL, Rfl: 3    cloNIDine (CATAPRES) 0.1 mg tablet, Take 1 tablet (0.1 mg total) by mouth daily., Disp: 90 tablet, Rfl: 0    HYDROcodone-acetaminophen (NORCO) 5-325 mg per tablet, take 1 tablet by mouth every 6 hours if needed for MODERATE PAIN FOR UP TO 10 DAYS Strength: 5-325 mg, Disp: 45 tablet, Rfl: 0    LORazepam (ATIVAN) 0.5 mg tablet, Take 1 tablet (0.5 mg total) by mouth 3 (three) times a day., Disp: 90 tablet, Rfl: 0    melatonin 5 mg tablet, Take 5 mg by mouth nightly.  , Disp: , Rfl:     meloxicam (MOBIC) 15 mg tablet, Take 15 mg by mouth daily. Stop on 05/24/21 for surgery , Disp: , Rfl:     multivitamin (THERAGRAN) tablet, take 1 tablet by oral route  every day, Disp: , Rfl:     omeprazole (PriLOSEC) 40 mg capsule, Take 1 capsule (40 mg total) by mouth daily before breakfast., Disp: 90 capsule, Rfl: 1    PARoxetine (PAXIL) 10 mg tablet, Take 1 tablet (10 mg total) by mouth daily., Disp: 90 tablet, Rfl: 0    traZODone (DESYREL) 100 mg tablet, TAKE 2 TABLETS BY MOUTH NIGHTLY GENERIC EQUIVALENT FOR DESYREL, Disp: 180 tablet, Rfl: 1    triamcinolone 0.1 % ointment, Apply 1 application topically 2 (two) times a day., Disp: 80 g, Rfl: 1      BP Readings from Last 3 Encounters:   10/20/22 100/70   05/04/22 120/74   01/05/22 110/70       Recent Lab results:  Lab Results   Component Value Date    CHOL 261 (H) 05/12/2022   ,   Lab Results   Component Value Date    HDL 55 05/12/2022   ,   Lab  "Results   Component Value Date    LDLCALC 185 (H) 05/12/2022   ,   Lab Results   Component Value Date    TRIG 120 05/12/2022        Lab Results   Component Value Date    GLUCOSE 77 05/12/2022   , No results found for: \"HGBA1C\"      Lab Results   Component Value Date    CREATININE 0.79 05/12/2022       Lab Results   Component Value Date    TSH 1.080 05/12/2022         No results found for: \"HGBA1C\"  "

## 2023-09-24 DIAGNOSIS — F32.A ANXIETY AND DEPRESSION: Primary | ICD-10-CM

## 2023-09-24 DIAGNOSIS — F41.9 ANXIETY AND DEPRESSION: Primary | ICD-10-CM

## 2023-09-26 RX ORDER — PAROXETINE 10 MG/1
10 TABLET, FILM COATED ORAL DAILY
Qty: 90 TABLET | Refills: 0 | Status: SHIPPED | OUTPATIENT
Start: 2023-09-26 | End: 2023-12-18